# Patient Record
Sex: FEMALE | Race: WHITE | NOT HISPANIC OR LATINO | Employment: FULL TIME | URBAN - NONMETROPOLITAN AREA
[De-identification: names, ages, dates, MRNs, and addresses within clinical notes are randomized per-mention and may not be internally consistent; named-entity substitution may affect disease eponyms.]

---

## 2017-01-06 ENCOUNTER — AMBULATORY - GICH (OUTPATIENT)
Dept: FAMILY MEDICINE | Facility: OTHER | Age: 42
End: 2017-01-06

## 2017-01-06 DIAGNOSIS — Z30.42 ENCOUNTER FOR SURVEILLANCE OF INJECTABLE CONTRACEPTIVE: ICD-10-CM

## 2017-03-27 ENCOUNTER — AMBULATORY - GICH (OUTPATIENT)
Dept: FAMILY MEDICINE | Facility: OTHER | Age: 42
End: 2017-03-27

## 2017-04-21 ENCOUNTER — AMBULATORY - GICH (OUTPATIENT)
Dept: RADIOLOGY | Facility: OTHER | Age: 42
End: 2017-04-21

## 2017-04-21 DIAGNOSIS — R92.8 OTHER ABNORMAL AND INCONCLUSIVE FINDINGS ON DIAGNOSTIC IMAGING OF BREAST: ICD-10-CM

## 2017-05-05 ENCOUNTER — HOSPITAL ENCOUNTER (OUTPATIENT)
Dept: RADIOLOGY | Facility: OTHER | Age: 42
End: 2017-05-05
Attending: FAMILY MEDICINE

## 2017-05-05 ENCOUNTER — HISTORY (OUTPATIENT)
Dept: RADIOLOGY | Facility: OTHER | Age: 42
End: 2017-05-05

## 2017-05-05 DIAGNOSIS — R92.8 OTHER ABNORMAL AND INCONCLUSIVE FINDINGS ON DIAGNOSTIC IMAGING OF BREAST: ICD-10-CM

## 2017-06-16 ENCOUNTER — AMBULATORY - GICH (OUTPATIENT)
Dept: FAMILY MEDICINE | Facility: OTHER | Age: 42
End: 2017-06-16

## 2017-09-01 ENCOUNTER — AMBULATORY - GICH (OUTPATIENT)
Dept: FAMILY MEDICINE | Facility: OTHER | Age: 42
End: 2017-09-01

## 2017-10-17 ENCOUNTER — AMBULATORY - GICH (OUTPATIENT)
Dept: RADIOLOGY | Facility: OTHER | Age: 42
End: 2017-10-17

## 2017-10-17 DIAGNOSIS — R92.8 OTHER ABNORMAL AND INCONCLUSIVE FINDINGS ON DIAGNOSTIC IMAGING OF BREAST: ICD-10-CM

## 2017-11-20 ENCOUNTER — COMMUNICATION - GICH (OUTPATIENT)
Dept: FAMILY MEDICINE | Facility: OTHER | Age: 42
End: 2017-11-20

## 2017-11-20 ENCOUNTER — AMBULATORY - GICH (OUTPATIENT)
Dept: FAMILY MEDICINE | Facility: OTHER | Age: 42
End: 2017-11-20

## 2017-11-20 DIAGNOSIS — Z30.42 ENCOUNTER FOR SURVEILLANCE OF INJECTABLE CONTRACEPTIVE: ICD-10-CM

## 2017-12-28 NOTE — TELEPHONE ENCOUNTER
Patient Information     Patient Name MRN Sex Gala Wilder [id number] Female 1975      Telephone Encounter by Natasha Martinez at 2017  8:54 AM     Author:  Natasha Martinez Service:  (none) Author Type:  (none)     Filed:  2017  8:55 AM Encounter Date:  2017 Status:  Signed     :  Natasha Martinez            The order was placed in 2017, but her last physical was in 2016. Would you approve this shot for her today, then the injection nurse would let her know she should schedule a physical before she is due for the next one? She is on the schedule for the shot this afternoon.  Natasha Martinez [facility])  ..................2017   8:55 AM

## 2017-12-28 NOTE — TELEPHONE ENCOUNTER
Patient Information     Patient Name MRN Sex Gala Wilder [id number] Female 1975      Telephone Encounter by Clarissa Lanier RN at 2017  8:03 AM     Author:  Clarissa Lanier RN Service:  (none) Author Type:  NURS- Registered Nurse     Filed:  2017  8:06 AM Encounter Date:  2017 Status:  Signed     :  Clarissa Lanier RN (NURS- Registered Nurse)            Patient's order for Depo injection has  in October, PMD out of office till December. Schedulers to call and have pt seen for order renewal if another MD will not order for her . Last Pap was in  with OB GYN department and normal results. CLARISSA LANIER RN ....................  2017   8:04 AM

## 2017-12-30 NOTE — NURSING NOTE
Patient Information     Patient Name MRN Sex Gala Wilder [id number] Female 1975      Nursing Note by Clarissa Lanier RN at 2017  2:15 PM     Author:  Clarissa Lanier RN Service:  (none) Author Type:  NURS- Registered Nurse     Filed:  2017  2:14 PM Encounter Date:  2017 Status:  Signed     :  Clarissa Lanier RN (NURS- Registered Nurse)            Patient requests ongoing injections of Depo-Provera  Date of last DMPA:    Adverse reaction to last shot?  no  Heavy bleeding or more than 14 days bleeding since last shot?  no  Wants to continue contraception for another 3 months, knowing that fertility may not return for up to 18 months?  yes  Recent migraine headaches?  no  Breast problems since last shot?  no  Problems with excessive hair loss, acne or facial hair?  No     CLARISSA LANIER RN ....................  2017   2:08 PM

## 2017-12-30 NOTE — NURSING NOTE
Patient Information     Patient Name MRN Sex Gala Wilder [id number] Female 1975      Nursing Note by Jayde Santa RN at 2017  1:45 PM     Author:  Jayde Santa RN Service:  (none) Author Type:  NURS- Registered Nurse     Filed:  2017  2:03 PM Encounter Date:  2017 Status:  Signed     :  Jayde Santa RN (NURS- Registered Nurse)            Patient requests ongoing injections of Depo-Provera  Date of last DMPA:   Adverse reaction to last shot?  no  Heavy bleeding or more than 14 days bleeding since last shot?  no  Wants to continue contraception for another 3 months, knowing that fertility may not return for up to 18 months?  yes  Recent migraine headaches?  no  Breast problems since last shot?  no  Problems with excessive hair loss, acne or facial hair?  no    Jayde Santa RN ....................  2017   2:02 PM

## 2017-12-30 NOTE — NURSING NOTE
Patient Information     Patient Name MRN Sex Gala Wilder [id number] Female 1975      Nursing Note by Clarissa Lanier RN at 2017  2:15 PM     Author:  Clarissa Lanier RN Service:  (none) Author Type:  NURS- Registered Nurse     Filed:  2017  2:24 PM Encounter Date:  2017 Status:  Signed     :  Clarissa Lanier RN (NURS- Registered Nurse)            Patient requests ongoing injections of Depo-Provera  Date of last DMPA:    Adverse reaction to last shot?  no  Heavy bleeding or more than 14 days bleeding since last shot?  no  Wants to continue contraception for another 3 months, knowing that fertility may not return for up to 18 months?  yes  Recent migraine headaches?  no  Breast problems since last shot?  no  Problems with excessive hair loss, acne or facial hair?  No     CLARISSA LANIER RN ....................  2017   2:15 PM

## 2018-01-02 NOTE — NURSING NOTE
Patient Information     Patient Name MRN Sex Gala Wilder [id number] Female 1975      Nursing Note by Clarissa Lanier RN at 2017  2:30 PM     Author:  Clarissa Laneir RN Service:  (none) Author Type:  NURS- Registered Nurse     Filed:  2017  2:12 PM Encounter Date:  2017 Status:  Signed     :  Clarissa Lanier RN (NURS- Registered Nurse)            Patient requests ongoing injections of Depo-Provera  Date of last DMPA:    Adverse reaction to last shot?  no  Heavy bleeding or more than 14 days bleeding since last shot?  no  Wants to continue contraception for another 3 months, knowing that fertility may not return for up to 18 months?  yes  Recent migraine headaches?  no  Breast problems since last shot?  no  Problems with excessive hair loss, acne or facial hair?  No     CLARISSA LANIER RN ....................  2017   2:04 PM

## 2018-01-04 NOTE — NURSING NOTE
Patient Information     Patient Name MRN Sex Gala Wilder [id number] Female 1975      Nursing Note by Clarissa Lanier RN at 3/27/2017 11:15 AM     Author:  Clarissa Lanier RN Service:  (none) Author Type:  NURS- Registered Nurse     Filed:  3/27/2017 11:16 AM Encounter Date:  3/27/2017 Status:  Signed     :  Clarissa Lanier RN (NURS- Registered Nurse)            Patient requests ongoing injections of Depo-Provera  Date of last DMPA:    Adverse reaction to last shot?  no  Heavy bleeding or more than 14 days bleeding since last shot?  no  Wants to continue contraception for another 3 months, knowing that fertility may not return for up to 18 months?  yes  Recent migraine headaches?  no  Breast problems since last shot?  no  Problems with excessive hair loss, acne or facial hair?  No     CLARISSA LANIER RN ....................  3/27/2017   11:12 AM

## 2018-01-12 ENCOUNTER — HISTORY (OUTPATIENT)
Dept: RADIOLOGY | Facility: OTHER | Age: 43
End: 2018-01-12

## 2018-01-12 ENCOUNTER — HOSPITAL ENCOUNTER (OUTPATIENT)
Dept: RADIOLOGY | Facility: OTHER | Age: 43
End: 2018-01-12
Attending: FAMILY MEDICINE

## 2018-01-12 DIAGNOSIS — R92.8 OTHER ABNORMAL AND INCONCLUSIVE FINDINGS ON DIAGNOSTIC IMAGING OF BREAST: ICD-10-CM

## 2018-01-15 ENCOUNTER — HISTORY (OUTPATIENT)
Dept: OBGYN | Facility: OTHER | Age: 43
End: 2018-01-15

## 2018-01-15 ENCOUNTER — OFFICE VISIT - GICH (OUTPATIENT)
Dept: OBGYN | Facility: OTHER | Age: 43
End: 2018-01-15

## 2018-01-15 DIAGNOSIS — Z00.00 ENCOUNTER FOR GENERAL ADULT MEDICAL EXAMINATION WITHOUT ABNORMAL FINDINGS: ICD-10-CM

## 2018-01-15 DIAGNOSIS — F17.200 NICOTINE DEPENDENCE, UNCOMPLICATED: ICD-10-CM

## 2018-01-15 DIAGNOSIS — N87.0 MILD CERVICAL DYSPLASIA: ICD-10-CM

## 2018-01-15 DIAGNOSIS — Z80.3 FAMILY HISTORY OF MALIGNANT NEOPLASM OF BREAST: ICD-10-CM

## 2018-01-15 ASSESSMENT — PATIENT HEALTH QUESTIONNAIRE - PHQ9: SUM OF ALL RESPONSES TO PHQ QUESTIONS 1-9: 1

## 2018-01-18 PROBLEM — Z83.3 FAMILY HISTORY OF DIABETES MELLITUS: Status: ACTIVE | Noted: 2018-01-18

## 2018-01-18 PROBLEM — Z80.0 FAMILY HISTORY OF PANCREATIC CANCER: Status: ACTIVE | Noted: 2018-01-18

## 2018-01-18 RX ORDER — FLUTICASONE PROPIONATE 50 MCG
2 SPRAY, SUSPENSION (ML) NASAL DAILY
COMMUNITY
Start: 2016-02-19 | End: 2020-12-07

## 2018-01-18 RX ORDER — MEDROXYPROGESTERONE ACETATE 150 MG/ML
150 INJECTION, SUSPENSION INTRAMUSCULAR
COMMUNITY
Start: 2017-01-06 | End: 2020-12-07

## 2018-01-18 RX ORDER — BUPROPION HYDROCHLORIDE 150 MG/1
150 TABLET, EXTENDED RELEASE ORAL 2 TIMES DAILY
COMMUNITY
Start: 2016-02-19 | End: 2020-12-07

## 2018-01-27 VITALS — SYSTOLIC BLOOD PRESSURE: 124 MMHG | BODY MASS INDEX: 37.29 KG/M2 | DIASTOLIC BLOOD PRESSURE: 68 MMHG | WEIGHT: 240.4 LBS

## 2018-02-08 ENCOUNTER — DOCUMENTATION ONLY (OUTPATIENT)
Dept: FAMILY MEDICINE | Facility: OTHER | Age: 43
End: 2018-02-08

## 2018-02-09 VITALS
WEIGHT: 228 LBS | SYSTOLIC BLOOD PRESSURE: 156 MMHG | DIASTOLIC BLOOD PRESSURE: 100 MMHG | BODY MASS INDEX: 34.56 KG/M2 | HEIGHT: 68 IN | HEART RATE: 104 BPM

## 2018-02-11 ENCOUNTER — HEALTH MAINTENANCE LETTER (OUTPATIENT)
Age: 43
End: 2018-02-11

## 2018-02-11 ASSESSMENT — PATIENT HEALTH QUESTIONNAIRE - PHQ9: SUM OF ALL RESPONSES TO PHQ QUESTIONS 1-9: 1

## 2018-02-12 ENCOUNTER — ALLIED HEALTH/NURSE VISIT (OUTPATIENT)
Dept: FAMILY MEDICINE | Facility: OTHER | Age: 43
End: 2018-02-12
Attending: FAMILY MEDICINE
Payer: COMMERCIAL

## 2018-02-12 DIAGNOSIS — Z30.42 ENCOUNTER FOR SURVEILLANCE OF INJECTABLE CONTRACEPTIVE: Primary | ICD-10-CM

## 2018-02-12 PROCEDURE — 25000128 H RX IP 250 OP 636: Performed by: FAMILY MEDICINE

## 2018-02-12 PROCEDURE — 96372 THER/PROPH/DIAG INJ SC/IM: CPT | Performed by: FAMILY MEDICINE

## 2018-02-12 RX ORDER — MEDROXYPROGESTERONE ACETATE 150 MG/ML
150 INJECTION, SUSPENSION INTRAMUSCULAR
Status: DISCONTINUED | OUTPATIENT
Start: 2018-02-12 | End: 2018-10-08

## 2018-02-12 RX ADMIN — MEDROXYPROGESTERONE ACETATE 150 MG: 150 INJECTION, SUSPENSION INTRAMUSCULAR at 11:18

## 2018-02-12 NOTE — PROGRESS NOTES
Patient Information     Patient Name MRN Sex aGla Wilder [id number] Female 1975      Progress Notes by Kaity Cross at 2018  9:54 AM     Author:  Kaity Cross Service:  (none) Author Type:  (none)     Filed:  2018  9:54 AM Date of Service:  2018  9:54 AM Status:  Signed     :  Kaity Cross            Falls Risk Criteria:    Age 65 and older or under age 4        Sensory deficits    Poor vision    Use of ambulatory aides    Impaired judgment    Unable to walk independently    Meets High Risk criteria for falls:  no

## 2018-02-12 NOTE — MR AVS SNAPSHOT
"              After Visit Summary   2018    Gala Mendez    MRN: [id number]           Patient Information     Date Of Birth          1975        Visit Information        Provider Department      2018 11:15 AM  INJECTION NURSE [facility]        Today's Diagnoses     Encounter for surveillance of injectable contraceptive    -  1       Follow-ups after your visit        Who to contact     If you have questions or need follow up information about today's clinic visit or your schedule please contact [facility] directly at [phone].  Normal or non-critical lab and imaging results will be communicated to you by Boastifyhart, letter or phone within 4 business days after the clinic has received the results. If you do not hear from us within 7 days, please contact the clinic through [email or web address] or phone. If you have a critical or abnormal lab result, we will notify you by phone as soon as possible.  Submit refill requests through HourlyNerd or call your pharmacy and they will forward the refill request to us. Please allow 3 business days for your refill to be completed.          Additional Information About Your Visit        MyChart Information     HourlyNerd lets you send messages to your doctor, view your test results, renew your prescriptions, schedule appointments and more. To sign up, go to [email or web address] . Click on \"Log in\" on the left side of the screen, which will take you to the Welcome page. Then click on \"Sign up Now\" on the right side of the page.     You will be asked to enter the access code listed below, as well as some personal information. Please follow the directions to create your username and password.     Your access code is: [email or web address]  Expires: 2018 11:24 AM     Your access code will  in 90 days. If you need help or a new code, please call your [facility] or [phone].        Care EveryWhere ID     This is your Care " EveryWhere ID. This could be used by other organizations to access your [place] medical records  [id number]         Blood Pressure from Last 3 Encounters:   01/15/18 (!) 156/100   09/01/17 124/68   11/12/16 132/86    Weight from Last 3 Encounters:   01/15/18 228 lb (103.4 kg)   09/01/17 240 lb 6.4 oz (109 kg)   11/12/16 238 lb (108 kg)              Today, you had the following     No orders found for display       Primary Care Provider Office Phone # Fax #    Gaurav Dyer [phone] [address] [facility] [address]        Equal Access to Services     VIBHA GONZALEZ : Ish Ashraf, parag marsh, gasper kaalmadanny guerrero, layo liao . So [facility] [phone].    ATENCIÓN: Si habla español, tiene a serna disposición servicios gratuitos de asistencia lingüística. Llame al [phone].    We comply with applicable federal civil rights laws and [place] laws. We do not discriminate on the basis of race, color, national origin, age, disability, sex, sexual orientation, or gender identity.            Thank you!     Thank you for choosing [facility] AND [facility]  for your care. Our goal is always to provide you with excellent care. Hearing back from our patients is one way we can continue to improve our services. Please take a few minutes to complete the written survey that you may receive in the mail after your visit with us. Thank you!             Your Updated Medication List - Protect others around you: Learn how to safely use, store and throw away your medicines at www.disposemymeds.org.          This list is accurate as of 2/12/18 11:24 AM.  Always use your most recent med list.                   Brand Name Dispense Instructions for use Diagnosis    buPROPion 150 MG 12 hr tablet    WELLBUTRIN SR     Take 150 mg by mouth 2 times daily        fluticasone 50 MCG/ACT spray    FLONASE     Spray 2 sprays into both nostrils daily         medroxyPROGESTERone 150 MG/ML injection    DEPO-PROVERA     Inject 150 mg into the muscle

## 2018-02-12 NOTE — PROGRESS NOTES
Patientrequests ongoing injections of Depo-Provera  Date of last DMPA:    Adverse reaction to last shot?  no  Heavy bleeding or more than 14 days bleeding sincelast shot?  no  Wants to continue contraception for another 3 months, knowing that fertility may not return for up to 18 months?  yes  Recent migraine headaches?  no  Breast problems since last shot?  no  Problems with excessive hair loss, acne or facial hair?  No      Clarissa Lanier ....................  2/12/2018   11:15 AM

## 2018-02-12 NOTE — NURSING NOTE
Patient Information     Patient Name MRN Sex Gala Wilder [id number] Female 1975      Nursing Note by Naheed Flowers RN at 1/15/2018 10:00 AM     Author:  Naheed Flowers RN Service:  (none) Author Type:  NURS- Registered Nurse     Filed:  1/15/2018 10:24 AM Encounter Date:  1/15/2018 Status:  Signed     :  Naheed Flowers RN (NURS- Registered Nurse)            Patient is here for annual exam. Last pap smear collected was 2015 with normal results. Next due 2018. Patient would like this obtained today if possible. Also would like depo provera renewed for one year. Last shot given 2017. Next due 2018-2018.  Naheed Flowers RN............. 1/15/2018 9:45 AM

## 2018-02-13 NOTE — PROGRESS NOTES
Patient Information     Patient Name MRN Sex     Gala Mendez [id number] Female 1975      Progress Notes by Zoran Harrington MD at 1/15/2018 10:00 AM     Author:  Zoran Harrington MD Service:  (none) Author Type:  Physician     Filed:  1/15/2018 11:08 AM Encounter Date:  1/15/2018 Status:  Signed     :  Zoran Harrington MD (Physician)            ANNUAL EXAM ADULT FEMALE - GYN    Gala Mendez is a 42 y.o. female, G 2, P 2, here today for her annual gynecologic exam.    HPI:  Gala presents for an annual exam and also requests STD testing and she recently  form her .  She smokes approx. 1 pack od smokes a day but is considering giving them up.  Gala is employed full time at the [employer] and likes her job.  She underwent a mammogram last week.  She denies any pelvic pain, fevers, chills, nausea or vaginal discharge.     CURRENT MEDICATIONS:  Current Outpatient Prescriptions       Medication  Sig Dispense Refill     buPROPion (ZYBAN) 150 mg Sustained-Release tablet Take 1 tablet by mouth 2 times daily. 60 tablet 5     fluticasone (50 mcg per actuation) nasal solution (FLONASE) Inhale 2 Sprays into both nostrils once daily. PLEASE DISPENSE NASACORT OTC 32 g 11     medroxyPROGESTERone acetate, contraceptive, (DEPO-PROVERA) 150 mg/mL injection Inject 150 mg intramuscular every 3 months. 1 mL 0     Current Facility-Administered Medications         Medication  Dose Route Frequency Provider Last Rate     medroxyPROGESTERone acetate (contraceptive) 150 mg injection (DEPO-PROVERA)  150 mg Intra-Muscular q 12 weeks Gaurav Dyer MD       Medications have been reviewed by me and are current to the best of my knowledge and ability.      ALLERGIES:  Review of patient's allergies indicates no known allergies.     Past Medical History:     Diagnosis  Date     Abnormal pap 2008    with negative colposcopy      Hx of pregnancy     G2, P1-0-1-1,  x1, EAB x1         Past Surgical History:      Procedure  Laterality Date     COLPOSCOPY  2008    negative, abnormal pap       TONSILLECTOMY         SOCIAL HISTORY:  Social History     Social History        Marital status:       Spouse name: N/A     Number of children:  N/A     Years of education:  N/A     Occupational History      Not on file.     Social History Main Topics         Smoking status:  Current Every Day Smoker      Packs/day: 1.00      Years: 18.00      Types: Cigarettes      Smokeless tobacco:  Never Used      Alcohol use  0.6 oz/week     1 Standard drinks or equivalent per week      Drug use:  No      Sexual activity:  Yes      Partners: Male      Birth control/ protection: Injection      Other Topics  Concern     Not on file      Social History Narrative     Currently working on her dissertation for clinical psychology .      She works at a [employer].         and has a blended family of three boys,        p 8/28/2013.       Family History       Problem   Relation Age of Onset     Diabetes  Father      Diabetes  Other      family hx       Cancer-pancreatic  Other      family hx       Cancer-breast  Mother      Cancer-breast  Sister      Cancer-breast  Maternal Aunt        RISK FACTORS  Social History       Substance Use Topics         Smoking status:  Current Every Day Smoker      Packs/day: 1.00      Years: 18.00      Types: Cigarettes      Smokeless tobacco:  Never Used      Alcohol use  0.6 oz/week     1 Standard drinks or equivalent per week       Exercise Times/wk: 0  Seat Belt Use: 90%  Birth Control Method: Depo Provera  High Risk/Behavior: none    PREVENTIVE SERVICES  Preventive services were reviewed today, January 15, 2018.    LMP: Patient's last menstrual period was 11/13/2017 (approximate).  Menstrual Regularity: irregular  Flow: light    ROS  Review of Systems Negative.    PHYSICAL EXAM  /96 (Cuff Site: Right Arm, Position: Sitting, Cuff Size: Adult Regular)   "Pulse (!) 104  Ht 1.727 m (5' 8\")  Wt 103.4 kg (228 lb)  LMP 11/13/2017 (Approximate)  BMI 34.67 kg/m2  General Appearance:  Alert, appropriate appearance for age. No acute distress  HEENT Exam:  Grossly normal.  Neck / Thyroid Exam:  Supple, no masses, nodes or enlargement.  Chest/Respiratory Exam: Normal chest wall and respirations. Clear to auscultation.  Breast Exam: No dimpling, nipple retraction or discharge. No masses or nodes.  Cardiovascular Exam: Regular rate and rhythm. S1, S2, no murmur, click, gallop, or rubs.  Gastrointestinal Exam: Soft, non-tender, no masses or organomegaly.  Pelvic Exam Female: Vulva and vagina appear normal. Bimanual exam reveals normal uterus and adnexa. Clinical staff offered to be present for exam: Deep.   Lymphatic Exam: Non-palpable nodes in neck, clavicular, axillary, or inguinal regions.  Musculoskeletal Exam: Back is straight and non-tender, full ROM of upper and lower extremities.  Skin: no rash or abnormalities  Neurologic Exam: Normal gait and speech, no tremor.  Psychiatric Exam: Alert and oriented, appropriate affect.  Clinical staff offered to be present for exam: yes, initials of staff present: MAXIME  PHQ Depression Screening 1/15/2018   Date of PHQ exam (doc flow) 1/15/2018   1. Lack of interest/pleasure 0 - Not at all   2. Feeling down/depressed 0 - Not at all   PHQ-2 TOTAL SCORE 0   3. Trouble sleeping 0 - Not at all   4. Decreased energy 1 - Several days   5. Appetite change 0 - Not at all   6. Feelings of failure 0 - Not at all   7. Trouble concentrating 0 - Not at all   8. Activity level 0 - Not at all   9. Hurting yourself 0 - Not at all   PHQ-9 TOTAL SCORE 1   PHQ-9 Severity Level none   Functional Impairment not applicable   Some recent data might be hidden        ASSESSMENT/PLAN  1. Annual exam with normal findings  2. GC/chlamydia testing per patients request.  3. Tobacco use, counseling for 5 minutes.  4. Elevated blood pressure    BP Readings from Last " 1 Encounters:01/15/18 : 154/96  Ms. Mendez's blood pressure is out of the normal range for adults. Per JNC-8 guidelines normal adult blood pressure is < 120/80, pre-hypertensive is between 120/80 and 139/89, and hypertension is 140/90 or greater. Risks of hypertension were discussed. Patient's strategy will be weight loss and to recheck blood pressure in 1 months

## 2018-02-13 NOTE — NURSING NOTE
Patient Information     Patient Name MRN Gala Crowell [id number] Female 1975      Nursing Note by Naheed Flowers RN at 1/15/2018 10:00 AM     Author:  Naheed Flowers RN Service:  (none) Author Type:  NURS- Registered Nurse     Filed:  1/15/2018 10:25 AM Encounter Date:  1/15/2018 Status:  Signed     :  Naheed Flowers RN (NURS- Registered Nurse)            Patient's blood pressure rechecked. Was elevated again - 156/100. Patient offered same day appointment with primary physician. Patient declined siting personal stress as possible aggravator. Patient will make appointment with PCP at a later date to have blood pressure rechecked.  Naheed Flowers RN............. 1/15/2018 10:25 AM

## 2018-05-04 ENCOUNTER — ALLIED HEALTH/NURSE VISIT (OUTPATIENT)
Dept: FAMILY MEDICINE | Facility: OTHER | Age: 43
End: 2018-05-04
Attending: FAMILY MEDICINE
Payer: COMMERCIAL

## 2018-05-04 DIAGNOSIS — Z30.42 ENCOUNTER FOR SURVEILLANCE OF INJECTABLE CONTRACEPTIVE: Primary | ICD-10-CM

## 2018-05-04 PROCEDURE — 25000128 H RX IP 250 OP 636: Performed by: FAMILY MEDICINE

## 2018-05-04 PROCEDURE — 96372 THER/PROPH/DIAG INJ SC/IM: CPT

## 2018-05-04 RX ADMIN — MEDROXYPROGESTERONE ACETATE 150 MG: 150 INJECTION, SUSPENSION INTRAMUSCULAR at 13:05

## 2018-05-04 NOTE — PROGRESS NOTES
Patientrequests ongoing injections of Depo-Provera  Date of last DMPA:    Adverse reaction to last shot?  no  Heavy bleeding or more than 14 days bleeding sincelast shot?  no  Wants to continue contraception for another 3 months, knowing that fertility may not return for up to 18 months?  yes  Recent migraine headaches?  no  Breast problems since last shot?  no  Problems with excessive hair loss, acne or facial hair?  No     Clarissa Lanier ....................  5/4/2018   1:04 PM

## 2018-05-04 NOTE — MR AVS SNAPSHOT
"              After Visit Summary   5/4/2018    Gala Mendez    MRN: [id number]           Patient Information     Date Of Birth          1975        Visit Information        Provider Department      5/4/2018 1:15 PM  INJECTION NURSE [facility]        Today's Diagnoses     Encounter for surveillance of injectable contraceptive    -  1       Follow-ups after your visit        Your next 10 appointments already scheduled     May 04, 2018  1:15 PM CDT   Nurse Only with  INJECTION NURSE   [facility] ([facility])    [address]  [place] [address] [phone]              Who to contact     If you have questions or need follow up information about today's clinic visit or your schedule please contact [facility] directly at [phone].  Normal or non-critical lab and imaging results will be communicated to you by Eve Biomedicalhart, letter or phone within 4 business days after the clinic has received the results. If you do not hear from us within 7 days, please contact the clinic through [employer]icalhart or phone. If you have a critical or abnormal lab result, we will notify you by phone as soon as possible.  Submit refill requests through [email or web address] or call your pharmacy and they will forward the refill request to us. Please allow 3 business days for your refill to be completed.          Additional Information About Your Visit        Eve Biomedicalhart Information     [email or web address] lets you send messages to your doctor, view your test results, renew your prescriptions, schedule appointments and more. To sign up, go to [email or web address] . Click on \"Log in\" on the left side of the screen, which will take you to the Welcome page. Then click on \"Sign up Now\" on the right side of the page.     You will be asked to enter the access code listed below, as well as some personal information. Please follow the directions to create your username " and password.     Your access code is: [email or web address]  Expires: 2018 12:24 PM     Your access code will  in 90 days. If you need help or a new code, please call your [place] clinic or [phone].        Care EveryWhere ID     This is your Care EveryWhere ID. This could be used by other organizations to access your [place] medical records  [id number]         Blood Pressure from Last 3 Encounters:   01/15/18 (!) 156/100   17 124/68   16 132/86    Weight from Last 3 Encounters:   01/15/18 228 lb (103.4 kg)   17 240 lb 6.4 oz (109 kg)   16 238 lb (108 kg)              Today, you had the following     No orders found for display       Primary Care Provider Office Phone # Fax #    Gaurav Dyer [phone] [address] [facility] [address]        Equal Access to Services     [facility]: Hadii aad ku hadasho Soomaali, waaxda luqadaha, qaybta kaalmada adeegyada, waxay idiin hayaan nela liao . So [facility] [phone].    ATENCIÓN: Si habla español, tiene a serna disposición servicios gratuitos de asistencia lingüística. Llame al [phone].    We comply with applicable federal civil rights laws and [place] laws. We do not discriminate on the basis of race, color, national origin, age, disability, sex, sexual orientation, or gender identity.            Thank you!     Thank you for choosing [facility] AND [facility]  for your care. Our goal is always to provide you with excellent care. Hearing back from our patients is one way we can continue to improve our services. Please take a few minutes to complete the written survey that you may receive in the mail after your visit with us. Thank you!             Your Updated Medication List - Protect others around you: Learn how to safely use, store and throw away your medicines at www.disposemymeds.org.          This list is accurate as of 18  1:10 PM.  Always use your most recent med  list.                   Brand Name Dispense Instructions for use Diagnosis    buPROPion 150 MG 12 hr tablet    WELLBUTRIN SR     Take 150 mg by mouth 2 times daily        fluticasone 50 MCG/ACT spray    FLONASE     Spray 2 sprays into both nostrils daily        medroxyPROGESTERone 150 MG/ML injection    DEPO-PROVERA     Inject 150 mg into the muscle

## 2018-07-23 ENCOUNTER — ALLIED HEALTH/NURSE VISIT (OUTPATIENT)
Dept: FAMILY MEDICINE | Facility: OTHER | Age: 43
End: 2018-07-23
Attending: FAMILY MEDICINE
Payer: COMMERCIAL

## 2018-07-23 DIAGNOSIS — Z30.42 ENCOUNTER FOR DEPO-PROVERA CONTRACEPTION: Primary | ICD-10-CM

## 2018-07-23 PROCEDURE — 96372 THER/PROPH/DIAG INJ SC/IM: CPT

## 2018-07-23 PROCEDURE — 25000128 H RX IP 250 OP 636: Performed by: FAMILY MEDICINE

## 2018-07-23 RX ADMIN — MEDROXYPROGESTERONE ACETATE 150 MG: 150 INJECTION, SUSPENSION INTRAMUSCULAR at 08:40

## 2018-07-23 NOTE — PROGRESS NOTES
Patient Information     Patient Name  Gala Mendez MRN  [id number] Sex  Female   1975      Letter by Dm Guevara MD at      Author:  Dm Guevara MD Service:  (none) Author Type:  (none)    Filed:   Date of Service:   Status:  (Other)       [facility]  [address]  [place] [address]  [phone]         Gala Mendez   [address]  [facility] [address]      January 15, 2018  Date of Breast Imagin2018 10:23 AM    Dear Ms. Mendez:    We are pleased to inform you that the result of your recent breast imaging examination is normal/benign (not cancer).    A report of your results was sent to your health care provider(s).    Your images will become part of your medical file here at [facility] and will be available for your continuing care. You are responsible for informing any new health care provider or breast imaging facility of the date and location of this examination.    Although mammography is the most accurate method for early detection, not all cancers are found through mammography. If you notice any new changes in your breast(s) please inform your health care provider without delay.    Thank you for choosing [facility] to participate in your healthcare needs.         [facility] Recommendations for Early Breast Cancer Detection   in Women without Symptoms  When to start having mammograms to screen for breast cancer, and how often to have them, is a personal decision. It should be based on your preferences, your values and your risk for developing breast cancer. [facility] recommends that you and your health care provider together determine when mammograms are right for you.    [facility] recommends the following guidelines for women who have an average risk for breast cancer, based on American Cancer Society guidelines:    Age 40 to 44: Mammograms are  optional.     Age 45 to 54: Have a mammogram every year.           Age 55 and older: Have a mammogram every year, or transition to having one every 2 years. Continue to have mammograms as long as your health is good.    If you have a higher than average risk for breast cancer, your health care provider may recommend a different schedule.

## 2018-07-23 NOTE — PROGRESS NOTES
Patient Information     Patient Name  Gala Mendez MRN  [id number] Sex  Female   1975      Letter by Kaity Cross at      Author:  Kaity Cross Service:  (none) Author Type:  (none)    Filed:   Date of Service:   Status:  (Other)       [facility]  [address]  [place] [address]  [phone]                 Gala Mendez  [address] [place] [address]              2017    Dear Ms. Mendez:  It is time to schedule your follow up breast imaging.  Please call [phone] to schedule an appointment for this test if you have not already done so.    Thank you for choosing [facility] to participate in your healthcare needs.     Sincerely,    Mammography    [facility]                             [facility] Recommendations for Early Breast Cancer Detection   in Women without Symptoms  Ages 40-49: encouraged to have a screening mammogram every year or talk with your health care provider  Ages 50-74: should have a screening mammogram every year  Ages 75 and older: talk with your health care provider about how often to schedule a mammogram

## 2018-07-23 NOTE — MR AVS SNAPSHOT
"              After Visit Summary   2018    Gala Mendez    MRN: [id number]           Patient Information     Date Of Birth          1975        Visit Information        Provider Department      2018 8:45 AM  INJECTION NURSE [facility]        Today's Diagnoses     Encounter for Depo-Provera contraception    -  1       Follow-ups after your visit        Who to contact     If you have questions or need follow up information about today's clinic visit or your schedule please contact [facility] directly at [phone].  Normal or non-critical lab and imaging results will be communicated to you by RCT Logichart, letter or phone within 4 business days after the clinic has received the results. If you do not hear from us within 7 days, please contact the clinic through Done In :60 Seconds or phone. If you have a critical or abnormal lab result, we will notify you by phone as soon as possible.  Submit refill requests through Done In :60 Seconds or call your pharmacy and they will forward the refill request to us. Please allow 3 business days for your refill to be completed.          Additional Information About Your Visit        MyChart Information     Done In :60 Seconds lets you send messages to your doctor, view your test results, renew your prescriptions, schedule appointments and more. To sign up, go to [email or web address] :60 Seconds . Click on \"Log in\" on the left side of the screen, which will take you to the Welcome page. Then click on \"Sign up Now\" on the right side of the page.     You will be asked to enter the access code listed below, as well as some personal information. Please follow the directions to create your username and password.     Your access code is: [email or web address]  Expires: 10/21/2018  8:36 AM     Your access code will  in 90 days. If you need help or a new code, please call your [facility] or [phone].        Care EveryWhere ID     This is your Care EveryWhere ID. " This could be used by other organizations to access your [facility] medical records  [id number]         Blood Pressure from Last 3 Encounters:   01/15/18 (!) 156/100   09/01/17 124/68   11/12/16 132/86    Weight from Last 3 Encounters:   01/15/18 228 lb (103.4 kg)   09/01/17 240 lb 6.4 oz (109 kg)   11/12/16 238 lb (108 kg)              Today, you had the following     No orders found for display       Primary Care Provider Office Phone # Fax #    Gaurav Dyer [phone] [address] [facility] [address]        Equal Access to Services     VIBHA [facility] CenterKANIKA : Hadii harry Ashraf, parag marsh, gasper pinomadanny guerrero, layo perez. So [facility] [phone].    ATENCIÓN: Si habla español, tiene a serna disposición servicios gratuitos de asistencia lingüística. Llame al [phone].    We comply with applicable federal civil rights laws and [place] laws. We do not discriminate on the basis of race, color, national origin, age, disability, sex, sexual orientation, or gender identity.            Thank you!     Thank you for choosing [facility]  for your care. Our goal is always to provide you with excellent care. Hearing back from our patients is one way we can continue to improve our services. Please take a few minutes to complete the written survey that you may receive in the mail after your visit with us. Thank you!             Your Updated Medication List - Protect others around you: Learn how to safely use, store and throw away your medicines at www.disposemymeds.org.          This list is accurate as of 7/23/18  8:47 AM.  Always use your most recent med list.                   Brand Name Dispense Instructions for use Diagnosis    buPROPion 150 MG 12 hr tablet    WELLBUTRIN SR     Take 150 mg by mouth 2 times daily        fluticasone 50 MCG/ACT spray    FLONASE     Spray 2 sprays into both nostrils daily         medroxyPROGESTERone 150 MG/ML injection    DEPO-PROVERA     Inject 150 mg into the muscle

## 2018-07-23 NOTE — PROGRESS NOTES
Patientrequests ongoing injections of Depo-Provera  Date of last DMPA:    Adverse reaction to last shot?  no  Heavy bleeding or more than 14 days bleeding sincelast shot?  no  Wants to continue contraception for another 3 months, knowing that fertility may not return for up to 18 months?  yes  Recent migraine headaches?  no  Breast problems since last shot?  no  Problems with excessive hair loss, acne or facial hair?  No     Clarissa Lanier ....................  7/23/2018   8:39 AM

## 2018-07-24 NOTE — PROGRESS NOTES
Patient Information     Patient Name  Gala Mendez MRN  [id number] Sex  Female   1975      Letter by Jayde Haskins MD at      Author:  Jayde Haskins MD Service:  (none) Author Type:  (none)    Filed:   Date of Service:   Status:  (Other)       [facility]  [address]  [place] [address]  [phone]         Gala Mendez   [address]  [facility] [address]      May 9, 2017  Date of Breast Imagin2017  8:44 AM    Dear Ms. Mendez:    Your recent breast imaging examination showed an area that we believe is probably benign (probably not cancer). However, in 6 month(s), you should have a follow-up breast imaging study to confirm that this area has not changed. Please call [phone] to schedule an appointment for these tests if you have not already done so.    A report of your results was sent to your health care provider(s).    Your images will become part of your medical file here at [facility] and will be available for your continuing care. You are responsible for informing any new health care provider or breast imaging facility of the date and location of this examination.    Although mammography is the most accurate method for early detection, not all cancers are found through mammography. If you notice any new changes in your breast(s) please inform your health care provider without delay.    Thank you for choosing [facility] to participate in your healthcare needs.         [facility] Recommendations for Early Breast Cancer Detection   in Women without Symptoms  When to start having mammograms to screen for breast cancer, and how often to have them, is a personal decision. It should be based on your preferences, your values and your risk for developing breast cancer. [facility] recommends that you and your health care provider together determine when mammograms are right for you.    [facility] recommends the following guidelines for women who have an average risk for breast cancer, based on American Cancer Society guidelines:    Age 40 to 44: Mammograms are optional.     Age 45 to 54: Have a mammogram every year.           Age 55 and older: Have a mammogram every year, or transition to having one every 2 years. Continue to have mammograms as long as your health is good.    If you have a higher than average risk for breast cancer, your health care provider may recommend a different schedule.

## 2018-07-24 NOTE — PROGRESS NOTES
Patient Information     Patient Name  Gala Mendez MRN  [id number] Sex  Female   1975      Letter by Zoran Harrington MD at      Author:  Zoran Harrington MD Service:  (none) Author Type:  (none)    Filed:   Encounter Date:  1/15/2018 Status:  (Other)           Gala Mendez   [address] [place] [address]          2018    Dear Ms. Mendez:    Following are the tests completed during your last clinic visit.  The results of these tests are normal and require no further attention.    Pap smear    If you have any further questions or problems contact my office at  [phone]    Thank you,    Dr Zoran Harrington MD, NEEMA TURCIOS RN  Registered Nurse for [facility] OB/Gyn providers

## 2018-10-08 ENCOUNTER — ALLIED HEALTH/NURSE VISIT (OUTPATIENT)
Dept: FAMILY MEDICINE | Facility: OTHER | Age: 43
End: 2018-10-08
Attending: FAMILY MEDICINE
Payer: COMMERCIAL

## 2018-10-08 DIAGNOSIS — Z30.42 ENCOUNTER FOR SURVEILLANCE OF INJECTABLE CONTRACEPTIVE: ICD-10-CM

## 2018-10-08 DIAGNOSIS — Z30.42 ENCOUNTER FOR DEPO-PROVERA CONTRACEPTION: Primary | ICD-10-CM

## 2018-10-08 PROCEDURE — 96372 THER/PROPH/DIAG INJ SC/IM: CPT

## 2018-10-08 PROCEDURE — 25000128 H RX IP 250 OP 636: Performed by: FAMILY MEDICINE

## 2018-10-08 RX ORDER — MEDROXYPROGESTERONE ACETATE 150 MG/ML
150 INJECTION, SUSPENSION INTRAMUSCULAR CONTINUOUS PRN
Status: COMPLETED | OUTPATIENT
Start: 2018-10-08 | End: 2018-12-28

## 2018-10-08 RX ADMIN — MEDROXYPROGESTERONE ACETATE 150 MG: 150 INJECTION, SUSPENSION INTRAMUSCULAR at 13:44

## 2018-10-08 NOTE — MR AVS SNAPSHOT
"              After Visit Summary   10/8/2018    Gala Mendez    MRN: [id number]           Patient Information     Date Of Birth          1975        Visit Information        Provider Department      10/8/2018 1:45 PM  INJECTION NURSE [facility]        Today's Diagnoses     Encounter for Depo-Provera contraception    -  1    Encounter for surveillance of injectable contraceptive           Follow-ups after your visit        Who to contact     If you have questions or need follow up information about today's clinic visit or your schedule please contact [facility] directly at [phone].  Normal or non-critical lab and imaging results will be communicated to you by Vidatronichart, letter or phone within 4 business days after the clinic has received the results. If you do not hear from us within 7 days, please contact the clinic through Kinoost or phone. If you have a critical or abnormal lab result, we will notify you by phone as soon as possible.  Submit refill requests through ClearCare or call your pharmacy and they will forward the refill request to us. Please allow 3 business days for your refill to be completed.          Additional Information About Your Visit        MyChart Information     ClearCare lets you send messages to your doctor, view your test results, renew your prescriptions, schedule appointments and more. To sign up, go to [email or web address] . Click on \"Log in\" on the left side of the screen, which will take you to the Welcome page. Then click on \"Sign up Now\" on the right side of the page.     You will be asked to enter the access code listed below, as well as some personal information. Please follow the directions to create your username and password.     Your access code is: [email or web address]  Expires: 2019  1:38 PM     Your access code will  in 90 days. If you need help or a new code, please call your [place] clinic or [phone].      "   Care EveryWhere ID     This is your Care EveryWhere ID. This could be used by other organizations to access your [facility] medical records  [id number]         Blood Pressure from Last 3 Encounters:   01/15/18 (!) 156/100   09/01/17 124/68   11/12/16 132/86    Weight from Last 3 Encounters:   01/15/18 228 lb (103.4 kg)   09/01/17 240 lb 6.4 oz (109 kg)   11/12/16 238 lb (108 kg)              Today, you had the following     No orders found for display       Primary Care Provider Office Phone # Fax #    Gaurav Dyer [phone] [address]  [facility] [address]        Equal Access to Services     PHIL GONZALEZ : Hadii harry umanzoro Andriy, waaxda luqadaha, qaybta kaalmada adeegyada, layo liao . So [facility] [phone].    ATENCIÓN: Si habla español, tiene a serna disposición servicios gratuitos de asistencia lingüística. LlPaulding County Hospital [phone].    We comply with applicable federal civil rights laws and [place] laws. We do not discriminate on the basis of race, color, national origin, age, disability, sex, sexual orientation, or gender identity.            Thank you!     Thank you for choosing [facility]  for your care. Our goal is always to provide you with excellent care. Hearing back from our patients is one way we can continue to improve our services. Please take a few minutes to complete the written survey that you may receive in the mail after your visit with us. Thank you!             Your Updated Medication List - Protect others around you: Learn how to safely use, store and throw away your medicines at www.disposemymeds.org.          This list is accurate as of 10/8/18  1:51 PM.  Always use your most recent med list.                   Brand Name Dispense Instructions for use Diagnosis    buPROPion 150 MG 12 hr tablet    WELLBUTRIN SR     Take 150 mg by mouth 2 times daily        fluticasone 50 MCG/ACT spray    FLONASE      Spray 2 sprays into both nostrils daily        medroxyPROGESTERone 150 MG/ML injection    DEPO-PROVERA     Inject 150 mg into the muscle

## 2018-10-08 NOTE — PROGRESS NOTES
Patientrequests ongoing injections of Depo-Provera  Date of last DMPA:    Adverse reaction to last shot?  no  Heavy bleeding or more than 14 days bleeding sincelast shot?  no  Wants to continue contraception for another 3 months, knowing that fertility may not return for up to 18 months?  yes  Recent migraine headaches?  no  Breast problems since last shot?  no  Problems with excessive hair loss, acne or facial hair?  No     Clarissa Lanier ....................  10/8/2018   1:42 PM

## 2018-12-28 ENCOUNTER — ALLIED HEALTH/NURSE VISIT (OUTPATIENT)
Dept: FAMILY MEDICINE | Facility: OTHER | Age: 43
End: 2018-12-28
Attending: FAMILY MEDICINE
Payer: COMMERCIAL

## 2018-12-28 DIAGNOSIS — Z30.42 SURVEILLANCE FOR DEPO-PROVERA CONTRACEPTION: Primary | ICD-10-CM

## 2018-12-28 PROCEDURE — 25000128 H RX IP 250 OP 636: Performed by: FAMILY MEDICINE

## 2018-12-28 PROCEDURE — 96372 THER/PROPH/DIAG INJ SC/IM: CPT

## 2018-12-28 RX ADMIN — MEDROXYPROGESTERONE ACETATE 150 MG: 150 INJECTION, SUSPENSION INTRAMUSCULAR at 11:05

## 2018-12-28 NOTE — NURSING NOTE
Patientrequests ongoing injections of Depo-Provera  Date of last DMPA:    Adverse reaction to last shot?  No  Heavy bleeding or more than 14 days bleeding sincelast shot?  No  Wants to continue contraception for another 3 months, knowing that fertility may not return for up to 18 months?  Yes  Recent migraine headaches?  No  Breast problems since last shot?  No  Problems with excessive hair loss, acne or facial hair?  No    Leeann Hennessy ....................  12/28/2018   11:05 AM

## 2019-03-18 ENCOUNTER — ALLIED HEALTH/NURSE VISIT (OUTPATIENT)
Dept: FAMILY MEDICINE | Facility: OTHER | Age: 44
End: 2019-03-18
Attending: FAMILY MEDICINE
Payer: COMMERCIAL

## 2019-03-18 ENCOUNTER — HOSPITAL ENCOUNTER (OUTPATIENT)
Dept: MAMMOGRAPHY | Facility: OTHER | Age: 44
Discharge: HOME | End: 2019-03-18
Attending: FAMILY MEDICINE | Admitting: FAMILY MEDICINE
Payer: COMMERCIAL

## 2019-03-18 DIAGNOSIS — Z12.39 SCREENING BREAST EXAMINATION: ICD-10-CM

## 2019-03-18 DIAGNOSIS — Z30.42 ENCOUNTER FOR DEPO-PROVERA CONTRACEPTION: Primary | ICD-10-CM

## 2019-03-18 PROCEDURE — 96372 THER/PROPH/DIAG INJ SC/IM: CPT

## 2019-03-18 PROCEDURE — 77067 SCR MAMMO BI INCL CAD: CPT

## 2019-03-18 PROCEDURE — 25000128 H RX IP 250 OP 636: Performed by: FAMILY MEDICINE

## 2019-03-18 RX ORDER — MEDROXYPROGESTERONE ACETATE 150 MG/ML
150 INJECTION, SUSPENSION INTRAMUSCULAR CONTINUOUS PRN
Status: COMPLETED | OUTPATIENT
Start: 2019-03-18 | End: 2019-03-18

## 2019-03-18 RX ADMIN — MEDROXYPROGESTERONE ACETATE 150 MG: 150 INJECTION, SUSPENSION INTRAMUSCULAR at 10:13

## 2019-06-03 ENCOUNTER — OFFICE VISIT (OUTPATIENT)
Dept: OBGYN | Facility: OTHER | Age: 44
End: 2019-06-03
Attending: OBSTETRICS & GYNECOLOGY
Payer: COMMERCIAL

## 2019-06-03 VITALS
WEIGHT: 224 LBS | DIASTOLIC BLOOD PRESSURE: 80 MMHG | HEIGHT: 68 IN | SYSTOLIC BLOOD PRESSURE: 124 MMHG | BODY MASS INDEX: 33.95 KG/M2 | HEART RATE: 60 BPM

## 2019-06-03 DIAGNOSIS — Z12.4 PAP SMEAR FOR CERVICAL CANCER SCREENING: Primary | ICD-10-CM

## 2019-06-03 DIAGNOSIS — Z30.42 ENCOUNTER FOR SURVEILLANCE OF INJECTABLE CONTRACEPTIVE: ICD-10-CM

## 2019-06-03 PROCEDURE — 96372 THER/PROPH/DIAG INJ SC/IM: CPT

## 2019-06-03 PROCEDURE — G0123 SCREEN CERV/VAG THIN LAYER: HCPCS | Performed by: OBSTETRICS & GYNECOLOGY

## 2019-06-03 PROCEDURE — 99213 OFFICE O/P EST LOW 20 MIN: CPT | Performed by: OBSTETRICS & GYNECOLOGY

## 2019-06-03 PROCEDURE — 87624 HPV HI-RISK TYP POOLED RSLT: CPT | Mod: ZL | Performed by: OBSTETRICS & GYNECOLOGY

## 2019-06-03 PROCEDURE — 88142 CYTOPATH C/V THIN LAYER: CPT | Performed by: OBSTETRICS & GYNECOLOGY

## 2019-06-03 PROCEDURE — 25000128 H RX IP 250 OP 636: Performed by: OBSTETRICS & GYNECOLOGY

## 2019-06-03 RX ORDER — MEDROXYPROGESTERONE ACETATE 150 MG/ML
150 INJECTION, SUSPENSION INTRAMUSCULAR ONCE
Status: COMPLETED | OUTPATIENT
Start: 2019-06-03 | End: 2019-06-03

## 2019-06-03 RX ADMIN — MEDROXYPROGESTERONE ACETATE 150 MG: 150 INJECTION, SUSPENSION INTRAMUSCULAR at 10:40

## 2019-06-03 ASSESSMENT — PAIN SCALES - GENERAL: PAINLEVEL: NO PAIN (0)

## 2019-06-03 ASSESSMENT — MIFFLIN-ST. JEOR: SCORE: 1724.56

## 2019-06-03 NOTE — NURSING NOTE
Chief Complaint   Patient presents with     Gyn Exam     Pap smear        Medication Reconciliation: completed   Clarissa Tai LPN  6/3/2019 9:51 AM

## 2019-06-04 NOTE — PROGRESS NOTES
SUBJECTIVE:   Gala Mendez is a 43 year old female who presents for annual physical, pap and pelvic. She is amenorrheic for over a decade, she has been on Depo for more than a decade. She reports no side effects of this medication. She is interested in continuing depo and is due today for her next injection. She reports light spotting about once a year.   No signs or symptoms of menopause. She is unsure when her mother went through menopause, as she had a hysterectomy in her early 40s.    History of LSIL with high risk HPV and negative colposcopy in . ASCUS in . Most recent pap smears in 2018 and 2015 have been negative.   Denies bleeding, spotting, or abnormal vaginal discharge. Denies bowel or bladder issues.   Smoking cigarettes 1/2 pack/day. Not interested in quitting at this time.     Patient Active Problem List   Diagnosis     Allergic rhinitis     Backache     Cervical high risk human papillomavirus (HPV) DNA test positive     Cervical dysplasia, mild     Encounter for surveillance of injectable contraceptive     Family history of diabetes mellitus     Family history of pancreatic cancer     FHx: breast cancer     Papanicolaou smear of cervix with atypical squamous cells of undetermined significance (ASC-US)     Tobacco abuse     Past Medical History:   Diagnosis Date     Personal history of other medical treatment (CODE)     G2, P1-0-1-1,  x1, EAB x1     Personal history of other medical treatment (CODE)     2008,with negative colposcopy     Past Surgical History:   Procedure Laterality Date     OTHER SURGICAL HISTORY      ,[id number],COLPOSCOPY,negative, abnormal pap     TONSILLECTOMY      No Comments Provided     Current Outpatient Medications   Medication Sig Dispense Refill     buPROPion (WELLBUTRIN SR) 150 MG 12 hr tablet Take 150 mg by mouth 2 times daily       fluticasone (FLONASE) 50 MCG/ACT spray Spray 2 sprays into both nostrils daily       medroxyPROGESTERone  (DEPO-PROVERA) 150 MG/ML injection Inject 150 mg into the muscle       No Known Allergies  Social History     Socioeconomic History     Marital status: Single     Spouse name: None     Number of children: None     Years of education: None     Highest education level: None   Occupational History     None   Social Needs     Financial resource strain: None     Food insecurity:     Worry: None     Inability: None     Transportation needs:     Medical: None     Non-medical: None   Tobacco Use     Smoking status: Current Every Day Smoker     Packs/day: 0.75     Years: 18.00     Pack years: 13.50     Types: Cigarettes     Smokeless tobacco: Never Used   Substance and Sexual Activity     Alcohol use: Yes     Alcohol/week: 0.6 oz     Drug use: Never     Sexual activity: Yes     Partners: Male     Birth control/protection: Injection   Lifestyle     Physical activity:     Days per week: None     Minutes per session: None     Stress: None   Relationships     Social connections:     Talks on phone: None     Gets together: None     Attends Druze service: None     Active member of club or organization: None     Attends meetings of clubs or organizations: None     Relationship status: None     Intimate partner violence:     Fear of current or ex partner: None     Emotionally abused: None     Physically abused: None     Forced sexual activity: None   Other Topics Concern     None   Social History Narrative    Currently working on her dissertation for clinical psychology .      She works at a [employer].         and has a blended family of three boys,    p 8/28/2013.     Family History   Problem Relation Age of Onset     Diabetes Father         Diabetes     Diabetes Other         Diabetes,family hx     Pancreatic Cancer Other         Cancer-pancreatic,family hx     Breast Cancer Mother         Cancer-breast     Breast Cancer Sister         Cancer-breast     Breast Cancer Maternal Aunt         Cancer-breast  "      HEALTH MAINTENANCE:  Health Maintenance   Topic Date Due     PREVENTIVE CARE VISIT  1975     HIV SCREENING  08/10/1991     INFLUENZA VACCINE (Season Ended) 09/01/2019     PAP  01/15/2021     DTAP/TDAP/TD IMMUNIZATION (7 - Td) 10/24/2024     ZOSTER IMMUNIZATION (1 of 2) 08/10/2026     PHQ-2  Completed     IPV IMMUNIZATION  Completed     MENINGITIS IMMUNIZATION  Aged Out       REVIEW OF OUTSIDE RECORDS: YES - Date: 6/3/2019    OBJECTIVE:  /80 (BP Location: Right arm, Patient Position: Sitting, Cuff Size: Adult Large)   Pulse 60   Ht 1.727 m (5' 8\")   Wt 101.6 kg (224 lb)   Breastfeeding? No   BMI 34.06 kg/m    EXAM:   (female):  Vulva: No external lesions, normal hair distribution  Vagina: Moist, pink, no abnormal discharge, well rugated, no lesions  Cervix: Pap smear is taken, parous, smooth, pink, no visible lesions  Uterus: Normal size, anteverted, non-tender, mobile  Ovaries: No mass, non-tender, mobile     ASSESSMENT/PLAN  (Z12.4) Pap smear for cervical cancer screening  (primary encounter diagnosis)  Comment: pending  Plan: HPV High Risk Types DNA Cervical, Pap Screen         Thin Prep with HPV - recommended age 30 - 65         years (select HPV order below)        pending    (Z30.42) Encounter for surveillance of injectable contraceptive  Plan: medroxyPROGESTERone (DEPO-PROVERA) injection         150 mg        Discussed: Smoking Cessation, and potential harms of long term depo provera use including osteoporosis. Patient is not eligible for combination hormone due to age and smoking status. Patient chooses to continue Depo-Provera today but will consider non-estrogen alternative such as Mirena IUD. Mirena IUD brochure given.                 I have discussed with patient the risks, benefits, medications, treatment options and modalities.   I have instructed the patient to call or schedule a follow-up appointment if any problems or failure to improve.    Pt seen and examined.    Agree with " the above.  RANDALLJ

## 2019-06-07 LAB
COPATH REPORT: NORMAL
PAP: NORMAL

## 2019-06-13 LAB
FINAL DIAGNOSIS: NORMAL
HPV HR 12 DNA CVX QL NAA+PROBE: NEGATIVE
HPV16 DNA SPEC QL NAA+PROBE: NEGATIVE
HPV18 DNA SPEC QL NAA+PROBE: NEGATIVE
SPECIMEN DESCRIPTION: NORMAL
SPECIMEN SOURCE CVX/VAG CYTO: NORMAL

## 2019-09-20 ENCOUNTER — TELEPHONE (OUTPATIENT)
Dept: FAMILY MEDICINE | Facility: OTHER | Age: 44
End: 2019-09-20

## 2019-09-20 DIAGNOSIS — Z30.42 ENCOUNTER FOR SURVEILLANCE OF INJECTABLE CONTRACEPTIVE: ICD-10-CM

## 2019-09-20 NOTE — PROGRESS NOTES
"Pt has appt in NP room on 2019 for Depo.  Noted order  in 2017.     6/3/2019 appt with OB  \"Discussed: Smoking Cessation, and potential harms of long term depo provera use including osteoporosis. Patient is not eligible for combination hormone due to age and smoking status. Patient chooses to continue Depo-Provera today but will consider non-estrogen alternative such as Mirena IUD. Mirena IUD brochure given.                 I have discussed with patient the risks, benefits, medications, treatment options and modalities.   I have instructed the patient to call or schedule a follow-up appointment if any problems or failure to improve     Pt seen and examined.    Agree with the above.  SWJ\"    Pt due for appt with PCP and new order needed-Pt also late for Depo (-) and placed in 30 min appt.     Attempted to Contacted Pt to schedule with PCP/OB-GYN.  No answer.  Left message to return call.    No return call noted.  Will route order for 1 dose to Dr. Fernandez in absence of Dr. Garfield Forbes.    Shaila Fernandez RN  ....................  2019   4:38 PM      "

## 2019-09-20 NOTE — TELEPHONE ENCOUNTER
"Pt has appt in NP room on 2019 for Depo.  Noted order  in .     6/3/2019 appt with OB  \"Discussed: Smoking Cessation, and potential harms of long term depo provera use including osteoporosis. Patient is not eligible for combination hormone due to age and smoking status. Patient chooses to continue Depo-Provera today but will consider non-estrogen alternative such as Mirena IUD. Mirena IUD brochure given.                 I have discussed with patient the risks, benefits, medications, treatment options and modalities.   I have instructed the patient to call or schedule a follow-up appointment if any problems or failure to improve     Pt seen and examined.    Agree with the above.  SWJ\"    Pt due for appt with PCP and new order needed-Pt also late for Depo (-) and placed in 30 min appt.     Attempted to Contacted Pt to schedule with PCP/OB-GYN.  No answer.  Left message to return call.    No return call noted.  Will route order to Dr. Fernandez in absence of Dr. Garfield Forbes.    Shaila Fernandez RN  ....................  2019   4:35 PM          "

## 2019-09-23 ENCOUNTER — ALLIED HEALTH/NURSE VISIT (OUTPATIENT)
Dept: FAMILY MEDICINE | Facility: OTHER | Age: 44
End: 2019-09-23
Attending: INTERNAL MEDICINE
Payer: COMMERCIAL

## 2019-09-23 DIAGNOSIS — Z30.42 ENCOUNTER FOR DEPO-PROVERA CONTRACEPTION: Primary | ICD-10-CM

## 2019-09-23 DIAGNOSIS — Z30.42 ENCOUNTER FOR SURVEILLANCE OF INJECTABLE CONTRACEPTIVE: ICD-10-CM

## 2019-09-23 LAB — HCG UR QL: NEGATIVE

## 2019-09-23 PROCEDURE — 96372 THER/PROPH/DIAG INJ SC/IM: CPT

## 2019-09-23 PROCEDURE — 25000128 H RX IP 250 OP 636: Performed by: OBSTETRICS & GYNECOLOGY

## 2019-09-23 PROCEDURE — 81025 URINE PREGNANCY TEST: CPT | Mod: ZL | Performed by: OBSTETRICS & GYNECOLOGY

## 2019-09-23 RX ORDER — MEDROXYPROGESTERONE ACETATE 150 MG/ML
150 INJECTION, SUSPENSION INTRAMUSCULAR ONCE
Status: DISCONTINUED | OUTPATIENT
Start: 2019-06-03 | End: 2019-09-23

## 2019-09-23 RX ORDER — MEDROXYPROGESTERONE ACETATE 150 MG/ML
150 INJECTION, SUSPENSION INTRAMUSCULAR
Status: COMPLETED | OUTPATIENT
Start: 2019-09-23 | End: 2020-05-18

## 2019-09-23 RX ADMIN — MEDROXYPROGESTERONE ACETATE 150 MG: 150 INJECTION, SUSPENSION INTRAMUSCULAR at 08:26

## 2019-09-23 NOTE — PROGRESS NOTES
Verified patient's first and last name, and . Patient stated reason for visit today is to receive Depo. Patient denied any concerns with previous injections. Weight assessed: 229#. BP assessed: 130/80    Patient requests ongoing injections of Depo-Provera  Date of last DMPA:  6/3/19. Late on receiving depo. HCG urine test completed: Negative  The following questions asked by nurse:   Adverse reaction to last shot?  no  Heavy bleeding or more than 14 days bleeding sincelast shot?  no  Wants to continue contraception for another 3 months, knowing that fertility may not return for up to 18 months?  yes  Recent migraine headaches?  no  Breast problems since last shot?  no  Problems with excessive hair loss, acne or facial hair?  no    Depo Provera injection prepared and administered IM into left deltoid as ordered. Administration of medication documented in MAR (see MAR for further information regarding dose, lot #, NDC #, expiration date). Patient encouraged to wait in lobby for 15 minutes post-injection and notify staff immediately of any reaction. Next Depo Provera injection in series due . Patient provided appointment reminder card.       Aurea GOSSN, RN on 2019 at 8:27 AM

## 2019-09-23 NOTE — PROGRESS NOTES
Patient presented to injection room for Depo-Provera injection, but order had . Injection nurse called this nurse to inquire about order. This nurse obtained a verbal order from Dr. Garfield Forbes MD. Clinic-administered Depo-Provera was refilled for one year from last visit.    Shelbie Hall RN...................2019 8:08 AM

## 2019-12-13 ENCOUNTER — ALLIED HEALTH/NURSE VISIT (OUTPATIENT)
Dept: FAMILY MEDICINE | Facility: OTHER | Age: 44
End: 2019-12-13
Attending: FAMILY MEDICINE
Payer: COMMERCIAL

## 2019-12-13 DIAGNOSIS — Z30.42 ENCOUNTER FOR DEPO-PROVERA CONTRACEPTION: Primary | ICD-10-CM

## 2019-12-13 PROCEDURE — 96372 THER/PROPH/DIAG INJ SC/IM: CPT

## 2019-12-13 PROCEDURE — 25000128 H RX IP 250 OP 636: Performed by: OBSTETRICS & GYNECOLOGY

## 2019-12-13 RX ADMIN — MEDROXYPROGESTERONE ACETATE 150 MG: 150 INJECTION, SUSPENSION INTRAMUSCULAR at 13:22

## 2019-12-13 NOTE — PROGRESS NOTES
Verified patient's first and last name, and . Patient stated reason for visit today is to receive Depo. Patient denied any concerns with previous injections. Weight assessed: 230.6#. BP assessed: 130/90.    Patient requests ongoing injections of Depo-Provera  Date of last DMPA:  19.    The following questions asked by nurse:   Adverse reaction to last shot?  no  Heavy bleeding or more than 14 days bleeding sincelast shot?  no  Wants to continue contraception for another 3 months, knowing that fertility may not return for up to 18 months?  yes  Recent migraine headaches?  no  Breast problems since last shot?  no  Problems with excessive hair loss, acne or facial hair?  no    Depo Provera injection prepared and administered IM as ordered. Administration of medication documented in MAR (see MAR for further information regarding dose, lot #, NDC #, expiration date). Next Depo Provera injection in series due  - 2020. Patient provided appointment reminder card.

## 2020-03-02 ENCOUNTER — ALLIED HEALTH/NURSE VISIT (OUTPATIENT)
Dept: FAMILY MEDICINE | Facility: OTHER | Age: 45
End: 2020-03-02
Attending: FAMILY MEDICINE
Payer: COMMERCIAL

## 2020-03-02 ENCOUNTER — HEALTH MAINTENANCE LETTER (OUTPATIENT)
Age: 45
End: 2020-03-02

## 2020-03-02 DIAGNOSIS — Z30.42 ENCOUNTER FOR DEPO-PROVERA CONTRACEPTION: Primary | ICD-10-CM

## 2020-03-02 PROCEDURE — 25000128 H RX IP 250 OP 636: Performed by: OBSTETRICS & GYNECOLOGY

## 2020-03-02 PROCEDURE — 96372 THER/PROPH/DIAG INJ SC/IM: CPT

## 2020-03-02 RX ADMIN — MEDROXYPROGESTERONE ACETATE 150 MG: 150 INJECTION, SUSPENSION INTRAMUSCULAR at 11:13

## 2020-03-02 NOTE — PROGRESS NOTES
Verified patient's name and . Patient stated reason for visit today is to receive Depo. Patient denied any concerns with previous injections. Weight assessed: 220.8#. BP assessed: 150/90.    Patient requests ongoing injections of Depo-Provera  Date of last DMPA:  2019    The following questions asked by nurse:   Adverse reaction to last shot?  no  Heavy bleeding or more than 14 days bleeding sincelast shot?  no  Wants to continue contraception for another 3 months, knowing that fertility may not return for up to 18 months?  yes  Recent migraine headaches?  no  Breast problems since last shot?  no  Problems with excessive hair loss, acne or facial hair?  no    Depo Provera injection prepared and administered IM as ordered. Administration of medication documented in MAR (see MAR for further information regarding dose, lot #, NDC #, expiration date). Next Depo Provera injection in series due May 18 - 2020. Patient provided appointment reminder card.     Aurea GOSSN, RN on 3/2/2020 at 11:14 AM

## 2020-05-18 ENCOUNTER — ALLIED HEALTH/NURSE VISIT (OUTPATIENT)
Dept: FAMILY MEDICINE | Facility: OTHER | Age: 45
End: 2020-05-18
Attending: FAMILY MEDICINE
Payer: COMMERCIAL

## 2020-05-18 VITALS — SYSTOLIC BLOOD PRESSURE: 130 MMHG | BODY MASS INDEX: 36.37 KG/M2 | WEIGHT: 239.2 LBS | DIASTOLIC BLOOD PRESSURE: 90 MMHG

## 2020-05-18 DIAGNOSIS — Z30.42 ENCOUNTER FOR DEPO-PROVERA CONTRACEPTION: Primary | ICD-10-CM

## 2020-05-18 PROCEDURE — 25000128 H RX IP 250 OP 636: Performed by: OBSTETRICS & GYNECOLOGY

## 2020-05-18 PROCEDURE — 96372 THER/PROPH/DIAG INJ SC/IM: CPT

## 2020-05-18 RX ADMIN — MEDROXYPROGESTERONE ACETATE 150 MG: 150 INJECTION, SUSPENSION INTRAMUSCULAR at 09:17

## 2020-05-18 NOTE — PROGRESS NOTES
Clinic Administered Medication Documentation    Depo Provera Documentation    URINE HCG: not indicated    Depo-Provera Standing Order inclusion/exclusion criteria reviewed.   Patient meets: inclusion criteria     BP: 130/90  LAST PAP/EXAM:   Lab Results   Component Value Date    PAP NIL 06/03/2019       Prior to injection, verified patient identity using patient's name and date of birth. Medication was administered. Please see MAR and medication order for additional information.     Was entire vial of medication used? Yes  Vial/Syringe: Single dose vial  Expiration Date:  10/2021  NEXT INJECTION DUE: 8/3/20 - 8/17/20    Appointment reminder card provided to patient.       Aurea GOSSN, RN on 5/18/2020 at 9:18 AM

## 2020-08-03 ENCOUNTER — TELEPHONE (OUTPATIENT)
Dept: OBGYN | Facility: OTHER | Age: 45
End: 2020-08-03

## 2020-08-03 DIAGNOSIS — Z30.42 ENCOUNTER FOR DEPO-PROVERA CONTRACEPTION: Primary | ICD-10-CM

## 2020-08-03 RX ORDER — MEDROXYPROGESTERONE ACETATE 150 MG/ML
150 INJECTION, SUSPENSION INTRAMUSCULAR
Status: COMPLETED | OUTPATIENT
Start: 2020-08-04 | End: 2021-06-29

## 2020-08-03 NOTE — TELEPHONE ENCOUNTER
Order Request for Clinic Administered Medication  Order renewal required for Depo Provera. Order charmaine'd up. Will route to OB/GYN for review and consideration.       Monisha Mota RN, BSN on 8/3/2020 at 2:43 PM

## 2020-08-04 ENCOUNTER — ALLIED HEALTH/NURSE VISIT (OUTPATIENT)
Dept: FAMILY MEDICINE | Facility: OTHER | Age: 45
End: 2020-08-04
Attending: FAMILY MEDICINE
Payer: COMMERCIAL

## 2020-08-04 VITALS — WEIGHT: 238 LBS | DIASTOLIC BLOOD PRESSURE: 74 MMHG | BODY MASS INDEX: 36.19 KG/M2 | SYSTOLIC BLOOD PRESSURE: 142 MMHG

## 2020-08-04 DIAGNOSIS — Z30.42 ENCOUNTER FOR DEPO-PROVERA CONTRACEPTION: Primary | ICD-10-CM

## 2020-08-04 PROCEDURE — 25000128 H RX IP 250 OP 636: Performed by: OBSTETRICS & GYNECOLOGY

## 2020-08-04 PROCEDURE — 96372 THER/PROPH/DIAG INJ SC/IM: CPT

## 2020-08-04 RX ADMIN — MEDROXYPROGESTERONE ACETATE 150 MG: 150 INJECTION, SUSPENSION INTRAMUSCULAR at 08:55

## 2020-08-04 NOTE — PROGRESS NOTES
Clinic Administered Medication Documentation    Depo Provera Documentation    URINE HCG: not indicated    Depo-Provera Standing Order inclusion/exclusion criteria reviewed.   Patient meets: inclusion criteria     BP: Data Unavailable  LAST PAP/EXAM:   Lab Results   Component Value Date    PAP NIL 06/03/2019       Prior to injection, verified patient identity using patient's name and date of birth. Medication was administered. Please see MAR and medication order for additional information.     Was entire vial of medication used? Yes  Vial/Syringe: Single dose vial  Expiration Date:  11/2021    NEXT INJECTION DUE: 10/20/20 - 11/3/20    Appointment reminder card provided to patient.

## 2020-10-23 ENCOUNTER — ALLIED HEALTH/NURSE VISIT (OUTPATIENT)
Dept: FAMILY MEDICINE | Facility: OTHER | Age: 45
End: 2020-10-23
Attending: FAMILY MEDICINE
Payer: COMMERCIAL

## 2020-10-23 VITALS — SYSTOLIC BLOOD PRESSURE: 134 MMHG | WEIGHT: 240 LBS | BODY MASS INDEX: 36.49 KG/M2 | DIASTOLIC BLOOD PRESSURE: 78 MMHG

## 2020-10-23 DIAGNOSIS — Z30.42 ENCOUNTER FOR DEPO-PROVERA CONTRACEPTION: Primary | ICD-10-CM

## 2020-10-23 PROCEDURE — 96372 THER/PROPH/DIAG INJ SC/IM: CPT | Performed by: OBSTETRICS & GYNECOLOGY

## 2020-10-23 PROCEDURE — 250N000011 HC RX IP 250 OP 636: Performed by: OBSTETRICS & GYNECOLOGY

## 2020-10-23 RX ADMIN — MEDROXYPROGESTERONE ACETATE 150 MG: 150 INJECTION, SUSPENSION INTRAMUSCULAR at 09:14

## 2020-10-23 NOTE — PROGRESS NOTES
Clinic Administered Medication Documentation    Depo Provera Documentation    URINE HCG: not indicated    Depo-Provera Standing Order inclusion/exclusion criteria reviewed.   Patient meets: inclusion criteria     BP: Data Unavailable  LAST PAP/EXAM:   Lab Results   Component Value Date    PAP NIL 06/03/2019       Prior to injection, verified patient identity using patient's name and date of birth. Medication was administered. Please see MAR and medication order for additional information.     Was entire vial of medication used? Yes  Vial/Syringe: Single dose vial  Expiration Date:  02/2022    Patient instructed to report any adverse reaction to staff immediately .  NEXT INJECTION DUE: 1/8/21 - 1/22/21    Appointment reminder card provided to patient.     Destinee Zaldivar RN on 10/23/2020 at 9:07 AM

## 2020-12-07 ENCOUNTER — VIRTUAL VISIT (OUTPATIENT)
Dept: FAMILY MEDICINE | Facility: OTHER | Age: 45
End: 2020-12-07

## 2020-12-07 ENCOUNTER — HOSPITAL ENCOUNTER (OUTPATIENT)
Dept: PHYSICAL THERAPY | Facility: OTHER | Age: 45
Setting detail: THERAPIES SERIES
End: 2020-12-07
Attending: INTERNAL MEDICINE
Payer: COMMERCIAL

## 2020-12-07 ENCOUNTER — OFFICE VISIT (OUTPATIENT)
Dept: FAMILY MEDICINE | Facility: OTHER | Age: 45
End: 2020-12-07
Attending: NURSE PRACTITIONER
Payer: COMMERCIAL

## 2020-12-07 VITALS
HEART RATE: 88 BPM | HEIGHT: 68 IN | WEIGHT: 234.8 LBS | OXYGEN SATURATION: 95 % | DIASTOLIC BLOOD PRESSURE: 88 MMHG | SYSTOLIC BLOOD PRESSURE: 138 MMHG | TEMPERATURE: 97.9 F | RESPIRATION RATE: 18 BRPM | BODY MASS INDEX: 35.58 KG/M2

## 2020-12-07 DIAGNOSIS — R42 SPINNING SENSATION: ICD-10-CM

## 2020-12-07 DIAGNOSIS — H81.10 BENIGN PAROXYSMAL POSITIONAL VERTIGO, UNSPECIFIED LATERALITY: ICD-10-CM

## 2020-12-07 DIAGNOSIS — H81.10 BENIGN PAROXYSMAL POSITIONAL VERTIGO, UNSPECIFIED LATERALITY: Primary | ICD-10-CM

## 2020-12-07 PROCEDURE — 99214 OFFICE O/P EST MOD 30 MIN: CPT | Performed by: INTERNAL MEDICINE

## 2020-12-07 PROCEDURE — 97162 PT EVAL MOD COMPLEX 30 MIN: CPT | Mod: GP | Performed by: PHYSICAL THERAPIST

## 2020-12-07 RX ORDER — MECLIZINE HCL 12.5 MG 12.5 MG/1
12.5-25 TABLET ORAL 3 TIMES DAILY PRN
Qty: 30 TABLET | Refills: 1 | Status: SHIPPED | OUTPATIENT
Start: 2020-12-07 | End: 2023-06-07

## 2020-12-07 ASSESSMENT — ENCOUNTER SYMPTOMS
FEVER: 0
HEMATURIA: 0
SHORTNESS OF BREATH: 0
CONFUSION: 0
DIZZINESS: 1
WOUND: 0
BRUISES/BLEEDS EASILY: 0
CHILLS: 0
ABDOMINAL PAIN: 0
LIGHT-HEADEDNESS: 0

## 2020-12-07 ASSESSMENT — PAIN SCALES - GENERAL: PAINLEVEL: MODERATE PAIN (5)

## 2020-12-07 ASSESSMENT — MIFFLIN-ST. JEOR: SCORE: 1763.55

## 2020-12-07 NOTE — PROGRESS NOTES
"Nursing Notes:   Van Marinelli LPN  12/7/2020 11:05 AM  Signed  Chief Complaint   Patient presents with     Ear Problem     Right ear pain and dizziness started yesterday. She states that in the past when she has had ear infections that she gets dizzy and has ear pain.     Initial There were no vitals taken for this visit. Estimated body mass index is 36.49 kg/m  as calculated from the following:    Height as of 6/3/19: 1.727 m (5' 8\").    Weight as of 10/23/20: 108.9 kg (240 lb).    Medication Reconciliation: complete      Van Marinelli LPN    Nursing note reviewed with patient.  Accuracy and completeness verified.  Ms. Mendez is a 45 year old female who presents to [facility]:  Patient presents with:  Ear Problem      ICD-10-CM    1. Benign paroxysmal positional vertigo, unspecified laterality  H81.10 meclizine (ANTIVERT) 12.5 MG tablet     PHYSICAL THERAPY REFERRAL   2. Spinning sensation  R42 meclizine (ANTIVERT) 12.5 MG tablet     PHYSICAL THERAPY REFERRAL     HPI  Last week patient had a couple episodes of some balance issues that happened acutely.  States that the seem to happen if she turned abruptly while at work.  Last night she started having some pain in her right ear.  Having some dizzy spells and imbalance.  Seems to be mostly when she changes position to happen quite acutely did not last very long and go away rather abruptly.  Most of the issues are due to spinning sensation.    She reports that she had to prop up in the bed last night to help with some of her ear pain.    She has had ear infections in the past and is worried she might have an ear infection again today.  Mostly her right ear has had some pain.    Her second thought is may be she has some vertigo issues.    Based on history and exam, appears to have spinning sensation due to benign positional vertigo.  Physical therapy referral sent.  Start meclizine as needed for symptoms.    Outpatient follow-up as needed for new or " "worsening symptoms.    Review of Systems   Constitutional: Negative for chills and fever.   HENT: Positive for ear pain (right, mild). Negative for congestion and sneezing.    Respiratory: Negative for shortness of breath.    Cardiovascular: Negative for chest pain.   Gastrointestinal: Negative for abdominal pain.   Genitourinary: Negative for hematuria.   Musculoskeletal: Negative for gait problem.   Skin: Negative for rash and wound.   Neurological: Positive for dizziness (intermittent, acute with change of position / turning abruptly). Negative for syncope and light-headedness.   Hematological: Does not bruise/bleed easily.   Psychiatric/Behavioral: Negative for confusion.        Reviewed and updated as needed this visit by Provider   Tobacco  Allergies  Meds  Problems  Med Hx  Surg Hx  Fam Hx          EXAM:   Vitals:    12/07/20 1044   BP: 138/88   Pulse: 88   Resp: 18   Temp: 97.9  F (36.6  C)   TempSrc: Tympanic   SpO2: 95%   Weight: 106.5 kg (234 lb 12.8 oz)   Height: 1.727 m (5' 8\")       Current Pain Score: Moderate Pain (5)     BP Readings from Last 3 Encounters:   12/07/20 138/88   10/23/20 134/78   08/04/20 (!) 142/74      Wt Readings from Last 3 Encounters:   12/07/20 106.5 kg (234 lb 12.8 oz)   10/23/20 108.9 kg (240 lb)   08/04/20 108 kg (238 lb)      Estimated body mass index is 35.7 kg/m  as calculated from the following:    Height as of this encounter: 1.727 m (5' 8\").    Weight as of this encounter: 106.5 kg (234 lb 12.8 oz).     Physical Exam  Constitutional:       Appearance: Normal appearance. She is not ill-appearing.   HENT:      Head: Normocephalic and atraumatic.      Right Ear: Tympanic membrane, ear canal and external ear normal. There is no impacted cerumen.      Left Ear: Tympanic membrane, ear canal and external ear normal. There is no impacted cerumen.   Eyes:      General: No scleral icterus.     Conjunctiva/sclera: Conjunctivae normal.   Cardiovascular:      Rate and Rhythm: " Normal rate.      Pulses: Normal pulses.   Pulmonary:      Effort: Pulmonary effort is normal.   Musculoskeletal:         General: No deformity.   Lymphadenopathy:      Cervical: No cervical adenopathy.   Skin:     General: Skin is warm and dry.      Findings: No rash.   Neurological:      Mental Status: She is alert and oriented to person, place, and time. Mental status is at baseline.   Psychiatric:         Mood and Affect: Mood normal.         Behavior: Behavior normal.        Procedures   INVESTIGATIONS:  - Labs reviewed in [facility]     ASSESSMENT AND PLAN:  Problem List Items Addressed This Visit     None      Visit Diagnoses     Benign paroxysmal positional vertigo, unspecified laterality    -  Primary    Relevant Medications    meclizine (ANTIVERT) 12.5 MG tablet    Other Relevant Orders    PHYSICAL THERAPY REFERRAL    Spinning sensation        Relevant Medications    meclizine (ANTIVERT) 12.5 MG tablet    Other Relevant Orders    PHYSICAL THERAPY REFERRAL        -- Expected clinical course discussed    -- Medications and their side effects discussed    Patient Instructions   1. Benign paroxysmal positional vertigo, unspecified laterality  2. Spinning sensation    START:   - meclizine (ANTIVERT) 12.5 MG tablet; Take 1-2 tablets (12.5-25 mg) by mouth 3 times daily as needed for dizziness  Dispense: 30 tablet; Refill: 1  - PHYSICAL THERAPY REFERRAL; Future    Physical therapy referral sent  - they will call with date/time of appointment.      -- Vertigo treatment.     Return as needed for follow-up for new / worsening symptoms.    Clinic : [phone]  Appointment line: [phone]     John Stockton MD   Internal Medicine  [facility]     Portions of this note were dictated using speech recognition software. The note has been proofread but errors in the text may have been overlooked. Please contact me if there are any concerns regarding the accuracy of the dictation.

## 2020-12-07 NOTE — PROGRESS NOTES
"Date: 2020 09:47:11  Clinician: Stephen Valencia  Clinician NPI: [id number]  Patient: Gala Mendez  Patient : 1975  Patient Address: [place] [address], [address] [place] [address]  Patient Phone: [phone]  Visit Protocol: Ear pain  Patient Summary:  Gala is a 45 year old ( : 1975 ) female who initiated a [facility] Visit for swimmer's ear (outer ear infection). When asked the question \"Please sign me up to receive news, health information and promotions from [facility].\", Gala responded \"No\".   A synchronous visit is necessary because the patient reported the following abnormal symptoms:   Bilateral ear pain (requires clarification)    Severe ear pain (7-9 on a 10 point pain scale, requires clarification)   Gala reports that her ear pain started 2-4 days ago. The ear pain is located outside (external surface) and inside both ears.   In addition to the ear pain, Gala is experiencing itchiness, a feeling of fullness, and tenderness in the ear(s). Her ear(s) is tender to the touch on the ear canal, tragus, and mastoid process. Gala reports having fluid draining from the ear(s). She feels feverish but she was unable to measure her temperature.   Symptom Details     Pain: Gala is experiencing severe pain (7-9 on a 10 point pain scale). It gets worse when she eats or chews. It does not get worse when she gently pulls on the earlobe(s).     Drainage: The color of the fluid coming out of her ear(s) is yellow. The fluid is malodorous.      Gala denies redness in the ear(s). She also denies the possibility of a foreign object in the ear(s), ever having ear tubes, and recent injuries near the ear(s).   Precipitating events   Gala denies flying and swimming within the past week.   Pertinent medical history  She   Gala denies having immunosuppressive conditions (e.g., chemotherapy, HIV, organ transplant, long-term use of steroids or other immunosuppressive medications, splenectomy).   " Gala does not have diabetes.   Weight: 240 lbs   Gala smokes or uses smokeless tobacco.   She denies pregnancy and denies breastfeeding. She does not menstruate.   Additional health information pertinent to this [facility] Visit as reported by the patient (free text): Feeling dizziness and lightheaded   Weight: 240 lbs    MEDICATIONS: No current medications, ALLERGIES: NKDA  Clinician Response:  Dear Gala,   I am sorry you are not feeling well. Additional information was needed to clarify some of the details provided during your [facility] Visit. Because I was unable to reach you, I would like you to be seen in person to further discuss your health history and symptoms so you get the most appropriate care for you.  You will not be charged for this [facility] Visit. Thank you for trusting us with your care.   Diagnosis: Unable to contact patient  Diagnosis ICD: R69  Synchronous Triage: phone, status: incomplete, duration: 0 seconds

## 2020-12-07 NOTE — PATIENT INSTRUCTIONS
1. Benign paroxysmal positional vertigo, unspecified laterality  2. Spinning sensation    START:   - meclizine (ANTIVERT) 12.5 MG tablet; Take 1-2 tablets (12.5-25 mg) by mouth 3 times daily as needed for dizziness  Dispense: 30 tablet; Refill: 1  - PHYSICAL THERAPY REFERRAL; Future    Physical therapy referral sent  - they will call with date/time of appointment.      -- Vertigo treatment.     Return as needed for follow-up for new / worsening symptoms.    Clinic : [phone]  Appointment line: [phone]

## 2020-12-07 NOTE — PROGRESS NOTES
"   12/07/20 1200   Quick Adds   Quick Adds Vestibular Eval   Type of Visit Initial OP PT Evaluation   General Information   Start of Care Date 12/07/20   Referring Physician Dr. Stockton   Orders Evaluate and Treat as Indicated   Order Date 12/07/20   Medical Diagnosis BPPV, spinning sensation   Onset of illness/injury or Date of Surgery 11/30/20   Precautions/Limitations no known precautions/limitations   Surgical/Medical history reviewed Yes   Pertinent history of current problem (include personal factors and/or comorbidities that impact the POC) Brief occasioan episodes of dizziness started last week at work.  Over the weekend had an increase in intensity and duration of dizziness.  Did vomit this morning.  Doesn't really feel spinning when sitting upright, but did when laying flat when looking up at ceiling fan and light.  Less when rolled onto right or left side (equal bilaterally).  Seemed to be a little less after about 10 minutes.  Was seen by MD this morning.  Filled prescription for meclizine, but hasn't taken any yet.  Had similar symptoms in the past when she had an ear infection.  Was treated for infection and everything went back to normal.     Pertinent Visual History  Denies changes in vision or hearing.     Patient role/Employment history Employed  (full-time; clinical program therapist (various duties))   Living environment [facility]   Home/Community Accessibility Comments Able to complete self cares and household tasks, but feels \"off\".  Has to hold onto railing in stairway.   Fall Risk Screen   Fall screen completed by PT   Have you fallen 2 or more times in the past year? No   Have you fallen and had an injury in the past year? No   Is patient a fall risk? Yes   Fall screen comments Feels unsteady when walking.   Observation   Observation Patient demonstrates an occasional cervical tremor.  When this was brought to the patient's attention, she denied this having been a previous problem and " "states she didn't notice she was even moving her head.   Oculomotor Exam   Smooth Pursuit Abnormal   Smooth Pursuit Comment impaired; occasional saccadic intrustions   Saccades Abnormal   Saccades Comments impaired accuracy, frequently more than 1-2 eye motion to target   VOR Abnormal   VOR Comments 2-3 nystagmus after stopping horizontal head motion; No symptoms after vertical head motion.   VOR Cancellation Normal   Rapid Head Thrust Normal   Convergence Testing Normal   Convergence Testing Comments L eye drifts away at approximately 6\"   Infrared Goggle Exam or Frenzel Lense Exam   Vestibular Suppressant in Last 24 Hours? No   Exam completed with Frenzel Lenses   Spontaneous Nystagmus Negative   Gaze Evoked Nystagmus Negative   Head Shake Horizontal Nystagmus Negative   Eli-Hallpike (right) Negative   Eli-Hallpike (Left) Negative   HSCC Supine Roll Test (Right) Negative   HSCC Supine Roll Test (Left) Negative   Planned Therapy Interventions   Planned Therapy Interventions balance training;neuromuscular re-education;visual perception  (vestibular therapy)   Clinical Impression   Criteria for Skilled Therapeutic Interventions Met yes, treatment indicated   PT Diagnosis impaired mobility   Influenced by the following impairments impaired balance, impaired vestibular function, dizziness   Functional limitations due to impairments transfers, standing, walking, laying supine/sleep, self-cares, household and work tasks   Clinical Presentation Evolving/Changing   Clinical Presentation Rationale clinical observation   Clinical Decision Making (Complexity) Moderate complexity   Therapy Frequency 2 times/Week   Predicted Duration of Therapy Intervention (days/wks) 8 visits   Risk & Benefits of therapy have been explained Yes   Patient, Family & other staff in agreement with plan of care Yes   Clinical Impression Comments Negative positional testing today.  Signs indicate possible central vestibular dysfunction.  Patient " would benefit from repeated positional testing and reassessment of occulomotor testing as well.  Will continue to monitor symptoms over the next couple of days until able to be seen.   GOALS   PT Eval Goals 1;2;3   Goal 1   Goal Identifier walking   Goal Description Patient will be able to safely walk community level distances at functional pace for age (3.53 ft/sec or more) for ability to complete home and work tasks.   Target Date 01/04/21   Goal 2   Goal Identifier laying flat   Goal Description Patient will be able to lay supine with no limitation due to feeling of spinning for improved sleep quality.   Target Date 01/04/21   Goal 3   Goal Identifier head motion   Goal Description Patient will be able to look R/L with no limitation due to sensation of spinning or instability while standing and walking for improved functional mobility.   Target Date 01/04/21   Total Evaluation Time   PT Eval, Moderate Complexity Minutes (67122) 25

## 2020-12-07 NOTE — NURSING NOTE
"Chief Complaint   Patient presents with     Ear Problem     Right ear pain and dizziness started yesterday. She states that in the past when she has had ear infections that she gets dizzy and has ear pain.     Initial There were no vitals taken for this visit. Estimated body mass index is 36.49 kg/m  as calculated from the following:    Height as of 6/3/19: 1.727 m (5' 8\").    Weight as of 10/23/20: 108.9 kg (240 lb).    Medication Reconciliation: complete      Van Marinelli LPN  "

## 2020-12-11 ENCOUNTER — HOSPITAL ENCOUNTER (OUTPATIENT)
Dept: PHYSICAL THERAPY | Facility: OTHER | Age: 45
Setting detail: THERAPIES SERIES
End: 2020-12-11
Attending: INTERNAL MEDICINE
Payer: COMMERCIAL

## 2020-12-11 PROCEDURE — 97110 THERAPEUTIC EXERCISES: CPT | Mod: GP | Performed by: PHYSICAL THERAPIST

## 2020-12-20 ENCOUNTER — HEALTH MAINTENANCE LETTER (OUTPATIENT)
Age: 45
End: 2020-12-20

## 2021-01-13 NOTE — PROGRESS NOTES
"Outpatient Physical Therapy Discharge Note     Patient: Gala Mendez  : 1975    Beginning/End Dates of Reporting Period:  20 to 2021    Referring Provider: Dr. Stockton    Therapy Diagnosis: BPPV, spinning sensation       Client Self Report: NA  Patient was last seen 20.  Cancelled remaining appointments reporting symptom improvement, but requested chart remain \"open\".       Objective Measurements:  NA      Goals:  Unable to reassess due to unplanned discharge.  Goal Identifier walking   Goal Description Patient will be able to safely walk community level distances at functional pace for age (3.53 ft/sec or more) for ability to complete home and work tasks.   Target Date 21   Date Met      Progress:     Goal Identifier laying flat   Goal Description Patient will be able to lay supine with no limitation due to feeling of spinning for improved sleep quality.   Target Date 21   Date Met      Progress:     Goal Identifier head motion   Goal Description Patient will be able to look R/L with no limitation due to sensation of spinning or instability while standing and walking for improved functional mobility.   Target Date 21   Date Met      Progress:         Progress Toward Goals:   Patient was seen 2 visits.  Called to report improved symptoms, and cancelled remaining visits.  Was issued a HEP on 2nd visit.  Has not called to reschedule.         Plan:  Discharge from therapy.    Discharge:    Reason for Discharge: Patient has not been seen in greater than 30 days.    Equipment Issued: none    Discharge Plan: none  "

## 2021-01-18 ENCOUNTER — ALLIED HEALTH/NURSE VISIT (OUTPATIENT)
Dept: FAMILY MEDICINE | Facility: OTHER | Age: 46
End: 2021-01-18
Payer: COMMERCIAL

## 2021-01-18 VITALS — BODY MASS INDEX: 35.97 KG/M2 | WEIGHT: 236.6 LBS | DIASTOLIC BLOOD PRESSURE: 90 MMHG | SYSTOLIC BLOOD PRESSURE: 140 MMHG

## 2021-01-18 DIAGNOSIS — Z30.42 ENCOUNTER FOR DEPO-PROVERA CONTRACEPTION: Primary | ICD-10-CM

## 2021-01-18 PROCEDURE — 96372 THER/PROPH/DIAG INJ SC/IM: CPT | Performed by: OBSTETRICS & GYNECOLOGY

## 2021-01-18 PROCEDURE — 250N000011 HC RX IP 250 OP 636: Performed by: OBSTETRICS & GYNECOLOGY

## 2021-01-18 RX ADMIN — MEDROXYPROGESTERONE ACETATE 150 MG: 150 INJECTION, SUSPENSION INTRAMUSCULAR at 09:30

## 2021-01-18 NOTE — PROGRESS NOTES
BP: 140/90    LAST PAP/EXAM:   Lab Results   Component Value Date    PAP NIL 06/03/2019     URINE HCG:not indicated    The following medication was given:     MEDICATION: Depo Provera 150mg  ROUTE: IM  SITE: Ventrogluteal - Left  NEXT INJECTION DUE: 4/5/21 - 4/19/21   Provider: [facility]    Kathleen Giles RN  ....................  1/18/2021   9:34 AM

## 2021-03-13 ENCOUNTER — ALLIED HEALTH/NURSE VISIT (OUTPATIENT)
Dept: FAMILY MEDICINE | Facility: OTHER | Age: 46
End: 2021-03-13
Attending: FAMILY MEDICINE
Payer: COMMERCIAL

## 2021-03-13 DIAGNOSIS — R51.9 HEADACHE: Primary | ICD-10-CM

## 2021-03-13 PROCEDURE — C9803 HOPD COVID-19 SPEC COLLECT: HCPCS

## 2021-03-13 PROCEDURE — 87635 SARS-COV-2 COVID-19 AMP PRB: CPT | Mod: ZL | Performed by: FAMILY MEDICINE

## 2021-03-13 NOTE — PROCEDURES
Date of Admission: 6/25/19    Date of Discharge: 6/27/19    Admitting Physician: Cirilo Miranda MD    Consultations: Hospitalist Service    Admitting Diagnosis: Left Hip Osteoarthritis    Discharge Diagnosis: Left Hip Osteoarthritis    Surgical Procedures Performed: Left Direct Anterior PATRICK    Hospital Complications: None    Discharge Medications: No Changes to home meds; Oxycodone 5-10 mg po every 4 hours added for pain control;  BID for DVT prophylaxis    Discharge Disposition: Home    Discharge Diet: As at home    Discharge Activity: WBAT Left Lower Extremity; No Hip ROM precautions    Hospital Course:   The patient underwent a Left Direct Anterior PATRICK.  There were no intraoperative or immediate post operative complications.  Once stable in the PACU they were transferred to the hospital floor where they remained for the remainder of the hospital stay.  Pain management transitioned from IV to oral pain medications.  Physical/Occupational Therapy was consulted for early mobility and gait training as well as ADL training.  DVT prophylaxis was initiated on POD #1.  Vital signs and hemoglobin remained stable. The Hospitalist team was consulted for management of medical co-morbidities.  At time of discharge the patient was medically stable and cleared by Therapy for safe discharge home.        Follow-Up: 10-14 days [facility]    Questions: Call [phone] or [phone]   Patient swabbed for COVID-19 testing.  Lola Thompson LPN on 3/13/2021 at 2:20 PM    Headache

## 2021-03-14 LAB
LABORATORY COMMENT REPORT: NORMAL
SARS-COV-2 RNA RESP QL NAA+PROBE: NEGATIVE
SARS-COV-2 RNA RESP QL NAA+PROBE: NORMAL
SPECIMEN SOURCE: NORMAL
SPECIMEN SOURCE: NORMAL

## 2021-04-08 ENCOUNTER — ALLIED HEALTH/NURSE VISIT (OUTPATIENT)
Dept: FAMILY MEDICINE | Facility: OTHER | Age: 46
End: 2021-04-08
Attending: FAMILY MEDICINE
Payer: COMMERCIAL

## 2021-04-08 VITALS — DIASTOLIC BLOOD PRESSURE: 78 MMHG | BODY MASS INDEX: 36.89 KG/M2 | SYSTOLIC BLOOD PRESSURE: 132 MMHG | WEIGHT: 242.6 LBS

## 2021-04-08 DIAGNOSIS — Z30.42 ENCOUNTER FOR DEPO-PROVERA CONTRACEPTION: Primary | ICD-10-CM

## 2021-04-08 PROCEDURE — 96372 THER/PROPH/DIAG INJ SC/IM: CPT | Performed by: OBSTETRICS & GYNECOLOGY

## 2021-04-08 PROCEDURE — 250N000011 HC RX IP 250 OP 636: Performed by: OBSTETRICS & GYNECOLOGY

## 2021-04-08 RX ADMIN — MEDROXYPROGESTERONE ACETATE 150 MG: 150 INJECTION, SUSPENSION INTRAMUSCULAR at 14:44

## 2021-04-08 NOTE — PROGRESS NOTES
Clinic Administered Medication Documentation     Depo Provera Documentation     URINE HCG:not indicated     Depo-Provera Standing Order inclusion/exclusion criteria reviewed.   Patient meets: inclusion criteria    BP: 132/78  LAST PAP/EXAM:   Lab Results   Component Value Date    PAP NIL 06/03/2019       Prior to injection, verified patient identity using patient's name and date of birth. Medication was administered. Please see MAR and medication order for additional information.     Was entire vial of medication used? Yes  Vial/Syringe: Single dose vial  Expiration Date:  09/01/2022    Patient instructed to remain in clinic for 15 minutes and report any adverse reaction to staff immediately .  NEXT INJECTION DUE: 6/24/21 - 7/8/21      Kymberly Gross RN on 4/8/2021 at 2:53 PM

## 2021-04-18 ENCOUNTER — HEALTH MAINTENANCE LETTER (OUTPATIENT)
Age: 46
End: 2021-04-18

## 2021-06-29 ENCOUNTER — ALLIED HEALTH/NURSE VISIT (OUTPATIENT)
Dept: FAMILY MEDICINE | Facility: OTHER | Age: 46
End: 2021-06-29
Attending: FAMILY MEDICINE
Payer: COMMERCIAL

## 2021-06-29 VITALS — SYSTOLIC BLOOD PRESSURE: 120 MMHG | BODY MASS INDEX: 36.98 KG/M2 | WEIGHT: 243.2 LBS | DIASTOLIC BLOOD PRESSURE: 70 MMHG

## 2021-06-29 DIAGNOSIS — Z30.42 ENCOUNTER FOR DEPO-PROVERA CONTRACEPTION: Primary | ICD-10-CM

## 2021-06-29 PROCEDURE — 96372 THER/PROPH/DIAG INJ SC/IM: CPT | Performed by: OBSTETRICS & GYNECOLOGY

## 2021-06-29 PROCEDURE — 250N000011 HC RX IP 250 OP 636: Performed by: OBSTETRICS & GYNECOLOGY

## 2021-06-29 RX ADMIN — MEDROXYPROGESTERONE ACETATE 150 MG: 150 INJECTION, SUSPENSION INTRAMUSCULAR at 08:43

## 2021-06-29 NOTE — PROGRESS NOTES
Patient requests ongoing injections of Depo-Provera      Verified patient's first and last name, and . Patient stated reason for visit today is to receive a Depo Provera injection. Patient denied any concerns with previous injections.     BP: 120/70  WEIGHT: 243.2#  LAST PAP/EXAM:   Lab Results   Component Value Date    PAP NIL 2019     URINE HCG:not indicated    The following medication was given:   MEDICATION: Depo Provera 150mg  ROUTE: IM  SITE: Deltoid - Right    Depo Provera injection prepared and administered IM as ordered. Administration of medication documented in MAR (see MAR for further information regarding dose, lot #, NDC #, expiration date). Patient encouraged to wait in lobby for 15 minutes post-injection and notify staff immediately of any reaction. Next Depo Provera injection in series due NEXT INJECTION DUE: 21 - 21 . Patient provided appointment reminder card.       Kathleen Giles RN ....................  2021   8:43 AM

## 2021-07-01 ENCOUNTER — HOSPITAL ENCOUNTER (OUTPATIENT)
Dept: MAMMOGRAPHY | Facility: OTHER | Age: 46
Discharge: HOME | End: 2021-07-01
Attending: FAMILY MEDICINE | Admitting: FAMILY MEDICINE
Payer: COMMERCIAL

## 2021-07-01 DIAGNOSIS — Z12.31 VISIT FOR SCREENING MAMMOGRAM: ICD-10-CM

## 2021-07-01 PROCEDURE — 77063 BREAST TOMOSYNTHESIS BI: CPT

## 2021-09-13 DIAGNOSIS — Z30.42 ENCOUNTER FOR DEPO-PROVERA CONTRACEPTION: Primary | ICD-10-CM

## 2021-09-13 RX ORDER — MEDROXYPROGESTERONE ACETATE 150 MG/ML
150 INJECTION, SUSPENSION INTRAMUSCULAR
Status: DISCONTINUED | OUTPATIENT
Start: 2021-09-13 | End: 2023-01-13

## 2021-09-14 ENCOUNTER — ALLIED HEALTH/NURSE VISIT (OUTPATIENT)
Dept: FAMILY MEDICINE | Facility: OTHER | Age: 46
End: 2021-09-14
Attending: FAMILY MEDICINE
Payer: COMMERCIAL

## 2021-09-14 VITALS — WEIGHT: 243.8 LBS | DIASTOLIC BLOOD PRESSURE: 80 MMHG | SYSTOLIC BLOOD PRESSURE: 120 MMHG | BODY MASS INDEX: 37.07 KG/M2

## 2021-09-14 DIAGNOSIS — Z30.42 ENCOUNTER FOR DEPO-PROVERA CONTRACEPTION: Primary | ICD-10-CM

## 2021-09-14 PROCEDURE — 250N000011 HC RX IP 250 OP 636: Performed by: FAMILY MEDICINE

## 2021-09-14 PROCEDURE — 96372 THER/PROPH/DIAG INJ SC/IM: CPT | Performed by: FAMILY MEDICINE

## 2021-09-14 RX ADMIN — MEDROXYPROGESTERONE ACETATE 150 MG: 150 INJECTION, SUSPENSION INTRAMUSCULAR at 16:05

## 2021-09-14 NOTE — PROGRESS NOTES
Patient requests ongoing injections of Depo-Provera      Verified patient's first and last name, and . Patient stated reason for visit today is to receive a Depo Provera injection. Patient denied any concerns with previous injections.     LAST PAP/EXAM:   Lab Results   Component Value Date    PAP NIL 2019     URINE HCG:not indicated    The following medication was given:   MEDICATION: Depo Provera 150mg  ROUTE: IM  SITE: Deltoid - Left    Depo Provera injection prepared and administered IM as ordered. Administration of medication documented in MAR (see MAR for further information regarding dose, lot #, NDC #, expiration date). Patient encouraged to wait in lobby for 15 minutes post-injection and notify staff immediately of any reaction. Next Depo Provera injection in series due NEXT INJECTION DUE: 21 - 21 . Patient provided appointment reminder card.       Kathleen Giles RN ....................  2021   4:06 PM

## 2021-10-03 ENCOUNTER — HEALTH MAINTENANCE LETTER (OUTPATIENT)
Age: 46
End: 2021-10-03

## 2021-12-03 ENCOUNTER — ALLIED HEALTH/NURSE VISIT (OUTPATIENT)
Dept: FAMILY MEDICINE | Facility: OTHER | Age: 46
End: 2021-12-03
Attending: FAMILY MEDICINE
Payer: COMMERCIAL

## 2021-12-03 DIAGNOSIS — Z30.42 ENCOUNTER FOR DEPO-PROVERA CONTRACEPTION: Primary | ICD-10-CM

## 2021-12-03 PROCEDURE — 96372 THER/PROPH/DIAG INJ SC/IM: CPT | Performed by: FAMILY MEDICINE

## 2021-12-03 PROCEDURE — 250N000011 HC RX IP 250 OP 636: Performed by: FAMILY MEDICINE

## 2021-12-03 RX ADMIN — MEDROXYPROGESTERONE ACETATE 150 MG: 150 INJECTION, SUSPENSION INTRAMUSCULAR at 15:01

## 2021-12-03 NOTE — PROGRESS NOTES
Patient requests ongoing injections of Depo-Provera      Verified patient's first and last name, and . Patient stated reason for visit today is to receive a Depo Provera injection. Patient denied any concerns with previous injections.       LAST PAP/EXAM:   Lab Results   Component Value Date    PAP NIL 2019     URINE HCG:not indicated    The following medication was given:   MEDICATION: Depo Provera 150mg  ROUTE: IM  SITE: Deltoid - Right    Depo Provera injection prepared and administered IM as ordered. Administration of medication documented in MAR (see MAR for further information regarding dose, lot #, NDC #, expiration date). Patient encouraged to wait in lobby for 15 minutes post-injection and notify staff immediately of any reaction. Next Depo Provera injection in series due NEXT INJECTION DUE: 22 - 3/4/22 . Patient provided appointment reminder card.       Kathleen Giles RN ....................  12/3/2021   2:56 PM

## 2022-02-18 ENCOUNTER — ALLIED HEALTH/NURSE VISIT (OUTPATIENT)
Dept: FAMILY MEDICINE | Facility: OTHER | Age: 47
End: 2022-02-18
Attending: FAMILY MEDICINE
Payer: COMMERCIAL

## 2022-02-18 DIAGNOSIS — Z30.42 ENCOUNTER FOR DEPO-PROVERA CONTRACEPTION: Primary | ICD-10-CM

## 2022-02-18 PROCEDURE — 96372 THER/PROPH/DIAG INJ SC/IM: CPT | Performed by: FAMILY MEDICINE

## 2022-02-18 PROCEDURE — 250N000011 HC RX IP 250 OP 636: Performed by: FAMILY MEDICINE

## 2022-02-18 RX ADMIN — MEDROXYPROGESTERONE ACETATE 150 MG: 150 INJECTION, SUSPENSION INTRAMUSCULAR at 09:41

## 2022-04-19 ENCOUNTER — HOSPITAL ENCOUNTER (EMERGENCY)
Facility: OTHER | Age: 47
Discharge: HOME | End: 2022-04-19
Attending: EMERGENCY MEDICINE | Admitting: EMERGENCY MEDICINE
Payer: COMMERCIAL

## 2022-04-19 VITALS
SYSTOLIC BLOOD PRESSURE: 159 MMHG | HEART RATE: 87 BPM | TEMPERATURE: 98.1 F | WEIGHT: 243 LBS | BODY MASS INDEX: 36.95 KG/M2 | DIASTOLIC BLOOD PRESSURE: 119 MMHG | RESPIRATION RATE: 16 BRPM | OXYGEN SATURATION: 96 %

## 2022-04-19 DIAGNOSIS — K92.0 HEMATEMESIS, PRESENCE OF NAUSEA NOT SPECIFIED: ICD-10-CM

## 2022-04-19 DIAGNOSIS — K62.5 BRBPR (BRIGHT RED BLOOD PER RECTUM): ICD-10-CM

## 2022-04-19 DIAGNOSIS — I10 HYPERTENSION, UNSPECIFIED TYPE: ICD-10-CM

## 2022-04-19 LAB
ALBUMIN SERPL-MCNC: 4.4 G/DL (ref 3.5–5.7)
ALBUMIN UR-MCNC: NEGATIVE MG/DL
ALP SERPL-CCNC: 70 U/L (ref 34–104)
ALT SERPL W P-5'-P-CCNC: 12 U/L (ref 7–52)
ANION GAP SERPL CALCULATED.3IONS-SCNC: 9 MMOL/L (ref 3–14)
APPEARANCE UR: CLEAR
AST SERPL W P-5'-P-CCNC: 16 U/L (ref 13–39)
BASOPHILS # BLD AUTO: 0.1 10E3/UL (ref 0–0.2)
BASOPHILS NFR BLD AUTO: 1 %
BILIRUB SERPL-MCNC: 0.8 MG/DL (ref 0.3–1)
BILIRUB UR QL STRIP: NEGATIVE
BUN SERPL-MCNC: 11 MG/DL (ref 7–25)
CALCIUM SERPL-MCNC: 9.9 MG/DL (ref 8.6–10.3)
CHLORIDE BLD-SCNC: 103 MMOL/L (ref 98–107)
CO2 SERPL-SCNC: 26 MMOL/L (ref 21–31)
COLOR UR AUTO: ABNORMAL
CREAT SERPL-MCNC: 0.73 MG/DL (ref 0.6–1.2)
EOSINOPHIL # BLD AUTO: 0.1 10E3/UL (ref 0–0.7)
EOSINOPHIL NFR BLD AUTO: 1 %
ERYTHROCYTE [DISTWIDTH] IN BLOOD BY AUTOMATED COUNT: 11.9 % (ref 10–15)
GFR SERPL CREATININE-BSD FRML MDRD: >90 ML/MIN/1.73M2
GLUCOSE BLD-MCNC: 118 MG/DL (ref 70–105)
GLUCOSE UR STRIP-MCNC: NEGATIVE MG/DL
HCT VFR BLD AUTO: 49.7 % (ref 35–47)
HGB BLD-MCNC: 17.4 G/DL (ref 11.7–15.7)
HGB UR QL STRIP: NEGATIVE
IMM GRANULOCYTES # BLD: 0 10E3/UL
IMM GRANULOCYTES NFR BLD: 0 %
KETONES UR STRIP-MCNC: NEGATIVE MG/DL
LEUKOCYTE ESTERASE UR QL STRIP: NEGATIVE
LYMPHOCYTES # BLD AUTO: 1.7 10E3/UL (ref 0.8–5.3)
LYMPHOCYTES NFR BLD AUTO: 18 %
MCH RBC QN AUTO: 31 PG (ref 26.5–33)
MCHC RBC AUTO-ENTMCNC: 35 G/DL (ref 31.5–36.5)
MCV RBC AUTO: 89 FL (ref 78–100)
MONOCYTES # BLD AUTO: 0.4 10E3/UL (ref 0–1.3)
MONOCYTES NFR BLD AUTO: 4 %
MUCOUS THREADS #/AREA URNS LPF: PRESENT /LPF
NEUTROPHILS # BLD AUTO: 6.8 10E3/UL (ref 1.6–8.3)
NEUTROPHILS NFR BLD AUTO: 76 %
NITRATE UR QL: NEGATIVE
NRBC # BLD AUTO: 0 10E3/UL
NRBC BLD AUTO-RTO: 0 /100
PH UR STRIP: 7 [PH] (ref 5–9)
PLATELET # BLD AUTO: 257 10E3/UL (ref 150–450)
POTASSIUM BLD-SCNC: 3.8 MMOL/L (ref 3.5–5.1)
PROT SERPL-MCNC: 7.1 G/DL (ref 6.4–8.9)
RBC # BLD AUTO: 5.61 10E6/UL (ref 3.8–5.2)
RBC URINE: 1 /HPF
SODIUM SERPL-SCNC: 138 MMOL/L (ref 134–144)
SP GR UR STRIP: 1.02 (ref 1–1.03)
SQUAMOUS EPITHELIAL: 2 /HPF
UROBILINOGEN UR STRIP-MCNC: NORMAL MG/DL
WBC # BLD AUTO: 9 10E3/UL (ref 4–11)
WBC URINE: <1 /HPF

## 2022-04-19 PROCEDURE — 80053 COMPREHEN METABOLIC PANEL: CPT | Performed by: EMERGENCY MEDICINE

## 2022-04-19 PROCEDURE — 93010 ELECTROCARDIOGRAM REPORT: CPT | Performed by: INTERNAL MEDICINE

## 2022-04-19 PROCEDURE — 250N000013 HC RX MED GY IP 250 OP 250 PS 637: Performed by: EMERGENCY MEDICINE

## 2022-04-19 PROCEDURE — 93005 ELECTROCARDIOGRAM TRACING: CPT | Performed by: EMERGENCY MEDICINE

## 2022-04-19 PROCEDURE — 99283 EMERGENCY DEPT VISIT LOW MDM: CPT | Performed by: EMERGENCY MEDICINE

## 2022-04-19 PROCEDURE — 85025 COMPLETE CBC W/AUTO DIFF WBC: CPT | Performed by: EMERGENCY MEDICINE

## 2022-04-19 PROCEDURE — 36415 COLL VENOUS BLD VENIPUNCTURE: CPT | Performed by: EMERGENCY MEDICINE

## 2022-04-19 PROCEDURE — 81003 URINALYSIS AUTO W/O SCOPE: CPT | Performed by: EMERGENCY MEDICINE

## 2022-04-19 PROCEDURE — 99284 EMERGENCY DEPT VISIT MOD MDM: CPT | Performed by: EMERGENCY MEDICINE

## 2022-04-19 RX ORDER — MULTIPLE VITAMINS W/ MINERALS TAB 9MG-400MCG
1 TAB ORAL DAILY
COMMUNITY

## 2022-04-19 RX ORDER — CLONIDINE HYDROCHLORIDE 0.1 MG/1
0.1 TABLET ORAL ONCE
Status: COMPLETED | OUTPATIENT
Start: 2022-04-19 | End: 2022-04-19

## 2022-04-19 RX ORDER — COVID-19 ANTIGEN TEST
220 KIT MISCELLANEOUS 2 TIMES DAILY WITH MEALS
COMMUNITY

## 2022-04-19 RX ADMIN — CLONIDINE HYDROCHLORIDE 0.1 MG: 0.1 TABLET ORAL at 08:45

## 2022-04-19 ASSESSMENT — ENCOUNTER SYMPTOMS
DIARRHEA: 0
BLOOD IN STOOL: 1
NAUSEA: 1
VOMITING: 1
FEVER: 0
CHILLS: 0
LIGHT-HEADEDNESS: 1
AGITATION: 0
CHEST TIGHTNESS: 0
DYSURIA: 0
ABDOMINAL PAIN: 0
CONSTIPATION: 0
ARTHRALGIAS: 0
SHORTNESS OF BREATH: 0

## 2022-04-19 NOTE — ED TRIAGE NOTES
"ED Nursing Triage Note (General)   ________________________________    Gala Mendez is a 46 year old Female that presents to triage via private vehicle with her  for complaints of   Patient states last night she had a BM she noted bright red blood in her stool.  Patient states she then woke up this morning feeling nauseated and had an episode of hematemesis.  Patient states only 1 episode of emesis at this time.  Hematemesis was noted to be a darker red in color. Patient notes R) sided abdominal pain which was relieved slightly with changes in positioning.  On arrival patients states \"I feel more dizzy than pain, I just feel off\".  Hx of vertigo, patient has a script for meclizine, however, has not taken any. No noted hx of ulcers or varices, no recent medication changes.   Significant symptoms had onset 12 hours ago.  Patient appears alert behavior.  GCS-15  Airway: intact  Breathing noted as Normal  Action taken: 3      PRE HOSPITAL PRIOR LIVING SITUATION-home  "

## 2022-04-19 NOTE — ED PROVIDER NOTES
History     Chief Complaint   Patient presents with     Hematemesis     GI Problem     HPI  Gala Mendez is a 46 year old female who comes in concerned for having had some blood in her stool yesterday and threw up some blood this morning.  She said yesterday she had a bowel movement which felt completely normal.  It was not diarrhea she is not constipated.  She noticed however that there was quite a bit of bright red blood in the water afterwards.  She was able to see to the bottom of the toilet bowl but it definitely was a bloody looking water.  There is no pain.  She did not feel any bumps or anything suggestive of a hemorrhoid.  Since that time she has not had any further bowel movements or bleeding or leakage.  This morning she just has been feeling a little bit off and dizzy.  She does have a history of vertigo.  She had some episodes of emesis which she describes as dark brown and bloody.  Has not been feeling ill otherwise lately.  She has been taking Aleve daily for some time.      Allergies:  No Known Allergies    Problem List:    Patient Active Problem List    Diagnosis Date Noted     Family history of diabetes mellitus 01/18/2018     Priority: Medium     Family history of pancreatic cancer 01/18/2018     Priority: Medium     FHx: breast cancer 10/19/2016     Priority: Medium     Cervical dysplasia, mild 01/31/2012     Priority: Medium     Tobacco abuse 01/31/2012     Priority: Medium     Backache 12/12/2011     Priority: Medium     Cervical high risk human papillomavirus (HPV) DNA test positive 08/30/2011     Priority: Medium     Papanicolaou smear of cervix with atypical squamous cells of undetermined significance (ASC-US) 08/30/2011     Priority: Medium     Overview:   History of LSIL       Allergic rhinitis 08/16/2011     Priority: Medium     Encounter for surveillance of injectable contraceptive 06/29/2010     Priority: Medium        Past Medical History:    Past Medical History:   Diagnosis  Date     Personal history of other medical treatment (CODE)      Personal history of other medical treatment (CODE)        Past Surgical History:    Past Surgical History:   Procedure Laterality Date     OTHER SURGICAL HISTORY      2008,[facility]700,COLPOSCOPY,negative, abnormal pap     TONSILLECTOMY      No Comments Provided       Family History:    Family History   Problem Relation Age of Onset     Diabetes Father         Diabetes     Diabetes Other         Diabetes,family hx     Pancreatic Cancer Other         Cancer-pancreatic,family hx     Breast Cancer Mother         Cancer-breast     Breast Cancer Sister         Cancer-breast     Breast Cancer Maternal Aunt         Cancer-breast       Social History:  Marital Status:   [4]  Social History     Tobacco Use     Smoking status: Current Every Day Smoker     Packs/day: 0.75     Years: 18.00     Pack years: 13.50     Types: Cigarettes     Smokeless tobacco: Never Used   Substance Use Topics     Alcohol use: Yes     Alcohol/week: 1.0 standard drink     Drug use: Never        Medications:    meclizine (ANTIVERT) 12.5 MG tablet  multivitamin w/minerals (THERA-VIT-M) tablet  naproxen sodium 220 MG capsule          Review of Systems   Constitutional: Negative for chills and fever.   HENT: Negative for congestion.    Eyes: Negative for visual disturbance.   Respiratory: Negative for chest tightness and shortness of breath.    Cardiovascular: Negative for chest pain.   Gastrointestinal: Positive for blood in stool, nausea and vomiting. Negative for abdominal pain, constipation and diarrhea.   Genitourinary: Negative for dysuria.   Musculoskeletal: Negative for arthralgias.   Skin: Negative for rash.   Neurological: Positive for light-headedness.   Psychiatric/Behavioral: Negative for agitation.       Physical Exam   BP: (!) 189/109  Pulse: 112  Temp: 98.1  F (36.7  C)  Resp: 18  Weight: 110.2 kg (243 lb)  SpO2: 96 %      Physical Exam  Vitals and nursing note reviewed.  Exam conducted with a chaperone present.   Constitutional:       Appearance: Normal appearance.   HENT:      Head: Normocephalic and atraumatic.      Mouth/Throat:      Mouth: Mucous membranes are moist.   Eyes:      Conjunctiva/sclera: Conjunctivae normal.   Cardiovascular:      Rate and Rhythm: Normal rate and regular rhythm.      Heart sounds: Normal heart sounds.   Pulmonary:      Effort: Pulmonary effort is normal.      Breath sounds: Normal breath sounds.   Abdominal:      General: Abdomen is flat. Bowel sounds are normal. There is no distension.      Palpations: Abdomen is soft.      Tenderness: There is no abdominal tenderness.   Genitourinary:     Rectum: Normal.      Comments: No obvious external hemorrhoids.  Rectal tone is normal.  Do not palpate any abnormality such as internal hemorrhoids.  There is minimal discomfort, no obvious fissure.  Small amount of light brown stool and no obvious blood.  Skin:     General: Skin is warm and dry.   Neurological:      Mental Status: She is alert and oriented to person, place, and time.   Psychiatric:         Behavior: Behavior normal.         ED Course                 Procedures                Results for orders placed or performed during the hospital encounter of 04/19/22 (from the past 24 hour(s))   CBC with platelets differential    Narrative    The following orders were created for panel order CBC with platelets differential.  Procedure                               Abnormality         Status                     ---------                               -----------         ------                     CBC with platelets and d...[[id number]]  Abnormal            Final result                 Please view results for these tests on the individual orders.   Comprehensive metabolic panel   Result Value Ref Range    Sodium 138 134 - 144 mmol/L    Potassium 3.8 3.5 - 5.1 mmol/L    Chloride 103 98 - 107 mmol/L    Carbon Dioxide (CO2) 26 21 - 31 mmol/L    Anion Gap 9 3 - 14  mmol/L    Urea Nitrogen 11 7 - 25 mg/dL    Creatinine 0.73 0.60 - 1.20 mg/dL    Calcium 9.9 8.6 - 10.3 mg/dL    Glucose 118 (H) 70 - 105 mg/dL    Alkaline Phosphatase 70 34 - 104 U/L    AST 16 13 - 39 U/L    ALT 12 7 - 52 U/L    Protein Total 7.1 6.4 - 8.9 g/dL    Albumin 4.4 3.5 - 5.7 g/dL    Bilirubin Total 0.8 0.3 - 1.0 mg/dL    GFR Estimate >90 >60 mL/min/1.73m2   CBC with platelets and differential   Result Value Ref Range    WBC Count 9.0 4.0 - 11.0 10e3/uL    RBC Count 5.61 (H) 3.80 - 5.20 10e6/uL    Hemoglobin 17.4 (H) 11.7 - 15.7 g/dL    Hematocrit 49.7 (H) 35.0 - 47.0 %    MCV 89 78 - 100 fL    MCH 31.0 26.5 - 33.0 pg    MCHC 35.0 31.5 - 36.5 g/dL    RDW 11.9 10.0 - 15.0 %    Platelet Count 257 150 - 450 10e3/uL    % Neutrophils 76 %    % Lymphocytes 18 %    % Monocytes 4 %    % Eosinophils 1 %    % Basophils 1 %    % Immature Granulocytes 0 %    NRBCs per 100 WBC 0 <1 /100    Absolute Neutrophils 6.8 1.6 - 8.3 10e3/uL    Absolute Lymphocytes 1.7 0.8 - 5.3 10e3/uL    Absolute Monocytes 0.4 0.0 - 1.3 10e3/uL    Absolute Eosinophils 0.1 0.0 - 0.7 10e3/uL    Absolute Basophils 0.1 0.0 - 0.2 10e3/uL    Absolute Immature Granulocytes 0.0 <=0.4 10e3/uL    Absolute NRBCs 0.0 10e3/uL   UA with Microscopic reflex to Culture    Specimen: Urine, Clean Catch   Result Value Ref Range    Color Urine Light Yellow Colorless, Straw, Light Yellow, Yellow    Appearance Urine Clear Clear    Glucose Urine Negative Negative mg/dL    Bilirubin Urine Negative Negative    Ketones Urine Negative Negative mg/dL    Specific Gravity Urine 1.017 1.000 - 1.030    Blood Urine Negative Negative    pH Urine 7.0 5.0 - 9.0    Protein Albumin Urine Negative Negative mg/dL    Urobilinogen Urine Normal Normal, 2.0 mg/dL    Nitrite Urine Negative Negative    Leukocyte Esterase Urine Negative Negative    Mucus Urine Present (A) None Seen /LPF    RBC Urine 1 <=2 /HPF    WBC Urine <1 <=5 /HPF    Squamous Epithelials Urine 2 (H) <=1 /HPF     Narrative    Urine Culture not indicated       Medications   cloNIDine (CATAPRES) tablet 0.1 mg (has no administration in time range)       Assessments & Plan (with Medical Decision Making)     I have reviewed the nursing notes.    I have reviewed the findings, diagnosis, plan and need for follow up with the patient.  Patient had some bright red blood per rectum, most likely internal hemorrhoid.  She had a one-time episode last night and nothing since.  No obvious blood on rectal exam.  We discussed that this is most likely hemorrhoid and as long as it stops she would not need to come back in.  Did recommend following up to discuss with her primary care provider as well.  As far as her reported hematemesis.  She has been taking Aleve daily for some time.  I will have her start some Pepcid.  She is also quite hypertensive, she does state that she is anxious about being here.  We did recheck blood pressure on the opposite arm with a different cuff and the systolic is much improved at 153, however diastolic is still 106.  I am going to give her a one-time dose of clonidine to bring it down.  I recommended that she try to monitor her blood pressure.  I recommend following up with her primary provider to recheck this as well.    New Prescriptions    No medications on file       Final diagnoses:   BRBPR (bright red blood per rectum)   Hypertension, unspecified type   Hematemesis, presence of nausea not specified       4/19/2022   [facility] AND [facility]     Sridhar Sesay MD  04/19/22 [id number]

## 2022-04-20 LAB
ATRIAL RATE - MUSE: 87 BPM
DIASTOLIC BLOOD PRESSURE - MUSE: NORMAL MMHG
INTERPRETATION ECG - MUSE: NORMAL
P AXIS - MUSE: 55 DEGREES
PR INTERVAL - MUSE: 154 MS
QRS DURATION - MUSE: 86 MS
QT - MUSE: 372 MS
QTC - MUSE: 447 MS
R AXIS - MUSE: 20 DEGREES
SYSTOLIC BLOOD PRESSURE - MUSE: NORMAL MMHG
T AXIS - MUSE: 79 DEGREES
VENTRICULAR RATE- MUSE: 87 BPM

## 2022-05-14 ENCOUNTER — HEALTH MAINTENANCE LETTER (OUTPATIENT)
Age: 47
End: 2022-05-14

## 2022-05-18 ENCOUNTER — ALLIED HEALTH/NURSE VISIT (OUTPATIENT)
Dept: FAMILY MEDICINE | Facility: OTHER | Age: 47
End: 2022-05-18
Attending: FAMILY MEDICINE
Payer: COMMERCIAL

## 2022-05-18 VITALS — WEIGHT: 244.8 LBS | BODY MASS INDEX: 37.22 KG/M2

## 2022-05-18 DIAGNOSIS — Z30.42 ENCOUNTER FOR DEPO-PROVERA CONTRACEPTION: Primary | ICD-10-CM

## 2022-05-18 PROCEDURE — 96372 THER/PROPH/DIAG INJ SC/IM: CPT | Performed by: FAMILY MEDICINE

## 2022-05-18 PROCEDURE — 250N000011 HC RX IP 250 OP 636: Performed by: FAMILY MEDICINE

## 2022-05-18 RX ADMIN — MEDROXYPROGESTERONE ACETATE 150 MG: 150 INJECTION, SUSPENSION INTRAMUSCULAR at 10:56

## 2022-05-18 NOTE — PROGRESS NOTES
Patient requests ongoing injections of Depo-Provera      Verified patient's first and last name, and . Patient stated reason for visit today is to receive a Depo Provera injection. Patient denied any concerns with previous injections.       LAST PAP/EXAM:   Lab Results   Component Value Date    PAP NIL 2019     URINE HCG:not indicated    The following medication was given:   MEDICATION: Depo Provera 150mg  ROUTE: IM  SITE: Arm - Left    Depo Provera injection prepared and administered IM as ordered. Administration of medication documented in MAR (see MAR for further information regarding dose, lot #, NDC #, expiration date). Patient encouraged to wait in lobby for 15 minutes post-injection and notify staff immediately of any reaction. Next Depo Provera injection in series due NEXT INJECTION DUE: 8/3/22 - 22 . Patient provided appointment reminder card.       Kathleen Giles RN ....................  2022   10:56 AM

## 2022-05-31 ENCOUNTER — OFFICE VISIT (OUTPATIENT)
Dept: FAMILY MEDICINE | Facility: OTHER | Age: 47
End: 2022-05-31
Attending: NURSE PRACTITIONER
Payer: COMMERCIAL

## 2022-05-31 VITALS
DIASTOLIC BLOOD PRESSURE: 114 MMHG | RESPIRATION RATE: 20 BRPM | HEART RATE: 96 BPM | HEIGHT: 68 IN | OXYGEN SATURATION: 96 % | BODY MASS INDEX: 36.83 KG/M2 | WEIGHT: 243 LBS | SYSTOLIC BLOOD PRESSURE: 162 MMHG

## 2022-05-31 DIAGNOSIS — I10 BENIGN ESSENTIAL HYPERTENSION: ICD-10-CM

## 2022-05-31 DIAGNOSIS — R73.9 ELEVATED BLOOD SUGAR: ICD-10-CM

## 2022-05-31 DIAGNOSIS — Z12.11 COLON CANCER SCREENING: ICD-10-CM

## 2022-05-31 DIAGNOSIS — Z00.00 ROUTINE HISTORY AND PHYSICAL EXAMINATION OF ADULT: Primary | ICD-10-CM

## 2022-05-31 DIAGNOSIS — Z13.220 LIPID SCREENING: ICD-10-CM

## 2022-05-31 DIAGNOSIS — Z13.1 SCREENING FOR DIABETES MELLITUS: ICD-10-CM

## 2022-05-31 LAB
ANION GAP SERPL CALCULATED.3IONS-SCNC: 8 MMOL/L (ref 3–14)
BUN SERPL-MCNC: 9 MG/DL (ref 7–25)
CALCIUM SERPL-MCNC: 9.4 MG/DL (ref 8.6–10.3)
CHLORIDE BLD-SCNC: 107 MMOL/L (ref 98–107)
CHOLEST SERPL-MCNC: 191 MG/DL
CO2 SERPL-SCNC: 25 MMOL/L (ref 21–31)
CREAT SERPL-MCNC: 0.8 MG/DL (ref 0.6–1.2)
FASTING STATUS PATIENT QL REPORTED: YES
GFR SERPL CREATININE-BSD FRML MDRD: >90 ML/MIN/1.73M2
GLUCOSE BLD-MCNC: 104 MG/DL (ref 70–105)
HBA1C MFR BLD: 5.5 % (ref 4–6.2)
HDLC SERPL-MCNC: 34 MG/DL (ref 23–92)
LDLC SERPL CALC-MCNC: 109 MG/DL
NONHDLC SERPL-MCNC: 157 MG/DL
POTASSIUM BLD-SCNC: 3.8 MMOL/L (ref 3.5–5.1)
SODIUM SERPL-SCNC: 140 MMOL/L (ref 134–144)
TRIGL SERPL-MCNC: 242 MG/DL

## 2022-05-31 PROCEDURE — 99212 OFFICE O/P EST SF 10 MIN: CPT | Mod: 25 | Performed by: NURSE PRACTITIONER

## 2022-05-31 PROCEDURE — 36415 COLL VENOUS BLD VENIPUNCTURE: CPT | Mod: ZL | Performed by: NURSE PRACTITIONER

## 2022-05-31 PROCEDURE — 99396 PREV VISIT EST AGE 40-64: CPT | Performed by: NURSE PRACTITIONER

## 2022-05-31 PROCEDURE — 83036 HEMOGLOBIN GLYCOSYLATED A1C: CPT | Mod: ZL | Performed by: NURSE PRACTITIONER

## 2022-05-31 PROCEDURE — 80061 LIPID PANEL: CPT | Mod: ZL | Performed by: NURSE PRACTITIONER

## 2022-05-31 PROCEDURE — 82374 ASSAY BLOOD CARBON DIOXIDE: CPT | Mod: ZL | Performed by: NURSE PRACTITIONER

## 2022-05-31 RX ORDER — LISINOPRIL 10 MG/1
10 TABLET ORAL DAILY
Qty: 90 TABLET | Refills: 0 | Status: SHIPPED | OUTPATIENT
Start: 2022-05-31 | End: 2022-06-20

## 2022-05-31 ASSESSMENT — ENCOUNTER SYMPTOMS
DIARRHEA: 0
DIZZINESS: 0
JOINT SWELLING: 0
SHORTNESS OF BREATH: 0
SORE THROAT: 0
BREAST MASS: 0
PARESTHESIAS: 0
HEMATOCHEZIA: 1
DYSURIA: 0
HEARTBURN: 0
NAUSEA: 0
CONSTIPATION: 0
FEVER: 0
ARTHRALGIAS: 1
EYE PAIN: 0
ABDOMINAL PAIN: 0
COUGH: 0
HEADACHES: 0
CHILLS: 0
FREQUENCY: 0
HEMATURIA: 0
NERVOUS/ANXIOUS: 1
PALPITATIONS: 0
MYALGIAS: 1

## 2022-05-31 ASSESSMENT — PAIN SCALES - GENERAL: PAINLEVEL: NO PAIN (0)

## 2022-05-31 NOTE — NURSING NOTE
Patient presents today for annual physical.    Medication Reconciliation Complete    Halina Fuentes LPN  5/31/2022 7:39 AM

## 2022-05-31 NOTE — PROGRESS NOTES
"  Assessment & Plan   Problem List Items Addressed This Visit    None     Visit Diagnoses     Routine history and physical examination of adult    -  Primary    Colon cancer screening        Relevant Orders    COLOGUARD(EXACT SCIENCES) (Completed)    Lipid screening        Relevant Orders    Lipid Panel (Completed)    Benign essential hypertension        Relevant Medications    lisinopril (ZESTRIL) 10 MG tablet    Other Relevant Orders    Basic Metabolic Panel (Completed)    Elevated blood sugar        Relevant Orders    Hemoglobin A1c (Completed)    Screening for diabetes mellitus        Relevant Orders    Hemoglobin A1c (Completed)      Hypertension, started on lisinopril 10 mg daily.  She will obtain a blood pressure cuff for home monitoring and follow-up in clinic in 2 to 3 weeks.  Basic metabolic panel was updated, recently when she is in the ER her labs showed elevated glucose so added a hemoglobin A1c today.  This was normal.  Discussed lifestyle modifications to help with hypertension including exercise, low-sodium diet and weight loss as well as smoking cessation.    Cologmary ordered today.    Follow-up annually and as needed.             Tobacco Cessation:   reports that she has been smoking cigarettes. She has a 13.50 pack-year smoking history. She has never used smokeless tobacco.  Tobacco Cessation Action Plan: Information offered: Patient not interested at this time    BMI:   Estimated body mass index is 36.95 kg/m  as calculated from the following:    Height as of this encounter: 1.727 m (5' 8\").    Weight as of this encounter: 110.2 kg (243 lb).   Weight management plan: Discussed healthy diet and exercise guidelines        No follow-ups on file.    [facility] AND [facility] is a 46 year old who presents for the following health issues     Healthy Habits:     Getting at least 3 servings of Calcium per day:  Yes    Bi-annual eye exam:  Yes    " Dental care twice a year:  NO    Sleep apnea or symptoms of sleep apnea:  None    Diet:  Regular (no restrictions)    Frequency of exercise:  2-3 days/week    Duration of exercise:  15-30 minutes    Taking medications regularly:  Yes    Medication side effects:  None    PHQ-2 Total Score: 0    Additional concerns today:  No     She comes in today for routine physical.  Her only concern today is hypertension.  She has had elevated blood pressures in previous clinic visits as well as outside of the clinic.  She does have a family history of hypertension.  Reports minimal salt intake, no shortness of breath, chest pains or edema.  Does report some shortness of breath when she is wearing her mask at work.  Reports a lot of stress at work including a long commute.  She works at the [employer].  She does smoke daily.  She was in the ER recently for rectal bleeding but reports she has not had any since.  She feels that this bleeding may have been related to vaginal bleeding.    She has not had a colonoscopy or Cologuard.  Denies any family history of colon cancer.  Last mammogram was in July 2021, no family history of breast cancer.  She is current on tetanus.  Opted not to get COVID-19 or influenza vaccine at this time.  Current on Pap smear.      Review of Systems   See above      Objective    There were no vitals taken for this visit.  There is no height or weight on file to calculate BMI.  Physical Exam   GENERAL: healthy, alert and no distress  EYES: Eyes grossly normal to inspection, PERRL and conjunctivae and sclerae normal  HENT: ear canals and TM's normal, nose and mouth without ulcers or lesions  NECK: no adenopathy, no asymmetry, masses, or scars and thyroid normal to palpation  RESP: lungs clear to auscultation - no rales, rhonchi or wheezes  BREAST: normal without masses, tenderness or nipple discharge and no palpable axillary masses or adenopathy  CV: regular rate and rhythm, normal S1 S2,  no S3 or S4, no murmur, click or rub, no peripheral edema and peripheral pulses strong  ABDOMEN: soft, nontender, no hepatosplenomegaly, no masses and bowel sounds normal  MS: no gross musculoskeletal defects noted, no edema  SKIN: no suspicious lesions or rashes  NEURO: Normal strength and tone, mentation intact and speech normal  PSYCH: mentation appears normal, affect normal/bright    Results for orders placed or performed in visit on 05/31/22 (from the past 24 hour(s))   Hemoglobin A1c   Result Value Ref Range    Hemoglobin A1C 5.5 4.0 - 6.2 %   Basic Metabolic Panel   Result Value Ref Range    Sodium 140 134 - 144 mmol/L    Potassium 3.8 3.5 - 5.1 mmol/L    Chloride 107 98 - 107 mmol/L    Carbon Dioxide (CO2) 25 21 - 31 mmol/L    Anion Gap 8 3 - 14 mmol/L    Urea Nitrogen 9 7 - 25 mg/dL    Creatinine 0.80 0.60 - 1.20 mg/dL    Calcium 9.4 8.6 - 10.3 mg/dL    Glucose 104 70 - 105 mg/dL    GFR Estimate >90 >60 mL/min/1.73m2   Lipid Panel   Result Value Ref Range    Cholesterol 191 <200 mg/dL    Triglycerides 242 (H) <150 mg/dL    Direct Measure HDL 34 23 - 92 mg/dL    LDL Cholesterol Calculated 109 (H) <=100 mg/dL    Non HDL Cholesterol 157 (H) <130 mg/dL    Patient Fasting > 8hrs? Yes     Narrative    Cholesterol  Desirable:  <200 mg/dL    Triglycerides  Normal:  Less than 150 mg/dL  Borderline High:  150-199 mg/dL  High:  200-499 mg/dL  Very High:  Greater than or equal to 500 mg/dL    Direct Measure HDL  Female:  Greater than or equal to 50 mg/dL   Male:  Greater than or equal to 40 mg/dL    LDL Cholesterol  Desirable:  <100mg/dL  Above Desirable:  100-129 mg/dL   Borderline High:  130-159 mg/dL   High:  160-189 mg/dL   Very High:  >= 190 mg/dL    Non HDL Cholesterol  Desirable:  130 mg/dL  Above Desirable:  130-159 mg/dL  Borderline High:  160-189 mg/dL  High:  190-219 mg/dL  Very High:  Greater than or equal to 220 mg/dL

## 2022-06-20 ENCOUNTER — OFFICE VISIT (OUTPATIENT)
Dept: FAMILY MEDICINE | Facility: OTHER | Age: 47
End: 2022-06-20
Attending: NURSE PRACTITIONER
Payer: COMMERCIAL

## 2022-06-20 VITALS
DIASTOLIC BLOOD PRESSURE: 82 MMHG | SYSTOLIC BLOOD PRESSURE: 138 MMHG | TEMPERATURE: 97.4 F | WEIGHT: 242 LBS | HEART RATE: 68 BPM | BODY MASS INDEX: 36.8 KG/M2 | RESPIRATION RATE: 20 BRPM

## 2022-06-20 DIAGNOSIS — I10 BENIGN ESSENTIAL HYPERTENSION: Primary | ICD-10-CM

## 2022-06-20 PROCEDURE — 99213 OFFICE O/P EST LOW 20 MIN: CPT | Performed by: NURSE PRACTITIONER

## 2022-06-20 RX ORDER — LISINOPRIL 20 MG/1
20 TABLET ORAL DAILY
Qty: 90 TABLET | Refills: 3 | Status: SHIPPED | OUTPATIENT
Start: 2022-06-20 | End: 2023-06-07

## 2022-06-20 ASSESSMENT — PAIN SCALES - GENERAL: PAINLEVEL: MILD PAIN (2)

## 2022-06-20 NOTE — PROGRESS NOTES
Assessment & Plan   Problem List Items Addressed This Visit    None     Visit Diagnoses     Benign essential hypertension    -  Primary    Relevant Medications    lisinopril (ZESTRIL) 20 MG tablet      /Hypertension, clinic blood pressure readings are stable although she has worked on some lifestyle changes prior to coming in today.  Home readings continue to be elevated.  This time we will have her increase her lisinopril to 20 mg daily.  She will continue to work on lifestyle changes.  She will monitor blood pressures at home.  If these are consistently greater than 140/90, she will follow-up to see me in 2 to 3 weeks, if these are coming down into her goal range, she will continue with medication and follow-up annually and as needed.      No follow-ups on file.    [facility]   Gala is a 46 year old, presenting for the following health issues:  Clinic Care Coordination - Follow-up      HPI     Hypertension Follow-up      Do you check your blood pressure regularly outside of the clinic? Yes     Are you following a low salt diet? No    Are your blood pressures ever more than 140 on the top number (systolic) OR more   than 90 on the bottom number (diastolic), for example 140/90? No    She comes in today for recheck of her blood pressure.  3 to 4 weeks ago she was seen for a routine physical, blood pressures were quite elevated.  She has had some elevated blood pressures in the past as well.  She was started on lisinopril 10 mg daily.  Home blood pressure readings have been between 150-160/.  These have been coming down and she is tolerating the medication well.  Blood pressure in clinic today is good although she states she did not have any coffee or cigarettes this morning before she came into the clinic.    Review of Systems   See above      Objective    /82 (BP Location: Left arm, Patient Position: Sitting, Cuff Size: Adult Large)    Pulse 68   Temp 97.4  F (36.3  C) (Tympanic)   Resp 20   Wt 109.8 kg (242 lb)   LMP 06/20/2020 (Approximate)   BMI 36.80 kg/m    Body mass index is 36.8 kg/m .  Physical Exam   GENERAL: healthy, alert and no distress  RESP: lungs clear to auscultation - no rales, rhonchi or wheezes  CV: regular rate and rhythm, normal S1 S2, no S3 or S4  PSYCH: mentation appears normal, affect normal/bright                    .  ..

## 2022-06-20 NOTE — NURSING NOTE
"Patient presents to the clinic for follow up.    FOOD SECURITY SCREENING QUESTIONS:    The next two questions are to help us understand your food security.  If you are feeling you need any assistance in this area, we have resources available to support you today.    Hunger Vital Signs:  Within the past 12 months we worried whether our food would run out before we got money to buy more. Never  Within the past 12 months the food we bought just didn't last and we didn't have money to get more. Never    Advance Care Directive on file? no  Advance Care Directive provided to patient? Yes.  Chief Complaint   Patient presents with     Clinic Care Coordination - Follow-up       Initial /82 (BP Location: Left arm, Patient Position: Sitting, Cuff Size: Adult Large)   Pulse 68   Temp 97.4  F (36.3  C) (Tympanic)   Resp 20   Wt 109.8 kg (242 lb)   LMP 06/20/2020 (Approximate)   BMI 36.80 kg/m   Estimated body mass index is 36.8 kg/m  as calculated from the following:    Height as of 5/31/22: 1.727 m (5' 8\").    Weight as of this encounter: 109.8 kg (242 lb).  Medication Reconciliation: complete        Laura Luo LPN       "

## 2022-07-05 ENCOUNTER — HOSPITAL ENCOUNTER (OUTPATIENT)
Dept: MAMMOGRAPHY | Facility: OTHER | Age: 47
Discharge: HOME | End: 2022-07-05
Attending: FAMILY MEDICINE | Admitting: FAMILY MEDICINE
Payer: COMMERCIAL

## 2022-07-05 DIAGNOSIS — Z12.31 VISIT FOR SCREENING MAMMOGRAM: ICD-10-CM

## 2022-07-05 PROCEDURE — 77067 SCR MAMMO BI INCL CAD: CPT

## 2022-07-15 ENCOUNTER — ALLIED HEALTH/NURSE VISIT (OUTPATIENT)
Dept: FAMILY MEDICINE | Facility: OTHER | Age: 47
End: 2022-07-15
Payer: COMMERCIAL

## 2022-07-15 DIAGNOSIS — Z20.822 SUSPECTED 2019 NOVEL CORONAVIRUS INFECTION: ICD-10-CM

## 2022-07-15 PROCEDURE — C9803 HOPD COVID-19 SPEC COLLECT: HCPCS

## 2022-07-15 PROCEDURE — U0003 INFECTIOUS AGENT DETECTION BY NUCLEIC ACID (DNA OR RNA); SEVERE ACUTE RESPIRATORY SYNDROME CORONAVIRUS 2 (SARS-COV-2) (CORONAVIRUS DISEASE [COVID-19]), AMPLIFIED PROBE TECHNIQUE, MAKING USE OF HIGH THROUGHPUT TECHNOLOGIES AS DESCRIBED BY CMS-2020-01-R: HCPCS | Mod: ZL

## 2022-07-17 LAB — SARS-COV-2 RNA RESP QL NAA+PROBE: POSITIVE

## 2022-07-19 ENCOUNTER — TELEPHONE (OUTPATIENT)
Dept: NURSING | Facility: CLINIC | Age: 47
End: 2022-07-19

## 2022-07-19 NOTE — TELEPHONE ENCOUNTER
Patient classified as COVID treatment eligible by Epic high risk algorithm:  Yes    Coronavirus (COVID-19) Notification    Reason for call  Notify of POSITIVE COVID-19 lab result, assess symptoms,  review [facility] recommendations    Lab Result   Lab test for 2019-nCoV rRt-PCR or SARS-COV-2 PCR  Oropharyngeal AND/OR nasopharyngeal swabs were POSITIVE for 2019-nCoV RNA [OR] SARS-COV-2 RNA (COVID-19) RNA     We have been unable to reach patient by phone at this time to notify of their Positive COVID-19 result.    Left voicemail message requesting a call back to [phone] [facility] for results.        A Positive COVID-19 letter will be sent via [employer] or the mail.    Kaley Alas

## 2022-08-10 ENCOUNTER — ALLIED HEALTH/NURSE VISIT (OUTPATIENT)
Dept: FAMILY MEDICINE | Facility: OTHER | Age: 47
End: 2022-08-10
Attending: FAMILY MEDICINE
Payer: COMMERCIAL

## 2022-08-10 DIAGNOSIS — Z30.42 ENCOUNTER FOR DEPO-PROVERA CONTRACEPTION: Primary | ICD-10-CM

## 2022-08-10 PROCEDURE — 96372 THER/PROPH/DIAG INJ SC/IM: CPT | Performed by: FAMILY MEDICINE

## 2022-08-10 PROCEDURE — 250N000011 HC RX IP 250 OP 636: Performed by: FAMILY MEDICINE

## 2022-08-10 RX ADMIN — MEDROXYPROGESTERONE ACETATE 150 MG: 150 INJECTION, SUSPENSION INTRAMUSCULAR at 07:48

## 2022-08-10 NOTE — PROGRESS NOTES
After verifying pt last name and date of birth, pt was given below injection    LAST PAP/EXAM:   Lab Results   Component Value Date    PAP NIL 06/03/2019     URINE HCG:not indicated    The following medication was given:     MEDICATION: Depo Provera 150mg  ROUTE: IM  SITE: Deltoid - Left  LOT #: [id number]  EXP:4/1/2024  NEXT INJECTION DUE: 10/26/22 - 11/9/22   Provider: SILVA Faulkner RN on 8/10/2022 at 7:49 AM

## 2022-09-04 ENCOUNTER — HEALTH MAINTENANCE LETTER (OUTPATIENT)
Age: 47
End: 2022-09-04

## 2022-10-28 ENCOUNTER — ALLIED HEALTH/NURSE VISIT (OUTPATIENT)
Dept: FAMILY MEDICINE | Facility: OTHER | Age: 47
End: 2022-10-28
Attending: FAMILY MEDICINE
Payer: COMMERCIAL

## 2022-10-28 DIAGNOSIS — Z30.42 ENCOUNTER FOR DEPO-PROVERA CONTRACEPTION: Primary | ICD-10-CM

## 2022-10-28 PROCEDURE — 250N000011 HC RX IP 250 OP 636: Performed by: FAMILY MEDICINE

## 2022-10-28 PROCEDURE — 96372 THER/PROPH/DIAG INJ SC/IM: CPT | Performed by: FAMILY MEDICINE

## 2022-10-28 RX ADMIN — MEDROXYPROGESTERONE ACETATE 150 MG: 150 INJECTION, SUSPENSION INTRAMUSCULAR at 08:13

## 2022-10-28 NOTE — PROGRESS NOTES
LAST PAP/EXAM:   Lab Results   Component Value Date    PAP NIL 06/03/2019     URINE HCG:not indicated    The following medication was given:     MEDICATION: Depo Provera 150mg  ROUTE: IM  SITE: Deltoid - Left  LOT #: [id number]  EXP:04/2024  NEXT INJECTION DUE: 1/13/23 - 1/27/23   Provider: SILVA Keen RN on 10/28/2022 at 8:14 AM

## 2023-01-13 DIAGNOSIS — Z30.42 ENCOUNTER FOR DEPO-PROVERA CONTRACEPTION: ICD-10-CM

## 2023-01-13 RX ORDER — MEDROXYPROGESTERONE ACETATE 150 MG/ML
150 INJECTION, SUSPENSION INTRAMUSCULAR
Status: ACTIVE | OUTPATIENT
Start: 2023-03-07

## 2023-01-13 NOTE — PROGRESS NOTES
Pt is coming into clinic on Monday for Depo injection. Pt needs updated orders for Depo. Medication T'ed up in chart. Please sign. Thank you    Routing to DOD per PCP out for the next 2 days. Thank you    Marguerite Keen RN on 1/13/2023 at 9:45 AM

## 2023-01-16 ENCOUNTER — ALLIED HEALTH/NURSE VISIT (OUTPATIENT)
Dept: FAMILY MEDICINE | Facility: OTHER | Age: 48
End: 2023-01-16
Attending: FAMILY MEDICINE
Payer: COMMERCIAL

## 2023-01-16 DIAGNOSIS — Z30.42 ENCOUNTER FOR DEPO-PROVERA CONTRACEPTION: Primary | ICD-10-CM

## 2023-01-16 PROCEDURE — 96372 THER/PROPH/DIAG INJ SC/IM: CPT | Performed by: FAMILY MEDICINE

## 2023-01-16 PROCEDURE — 250N000011 HC RX IP 250 OP 636: Performed by: FAMILY MEDICINE

## 2023-01-16 RX ADMIN — MEDROXYPROGESTERONE ACETATE 150 MG: 150 INJECTION, SUSPENSION INTRAMUSCULAR at 09:47

## 2023-01-16 NOTE — PROGRESS NOTES
LAST PAP/EXAM:   Lab Results   Component Value Date    PAP NIL 06/03/2019     URINE HCG:not indicated    The following medication was given:     MEDICATION: Depo Provera 150mg  ROUTE: IM  SITE: Deltoid - Right  LOT #: [id number]  EXP:05/2024  NEXT INJECTION DUE: 4/3/23 - 4/17/23   Provider: VINI ASCENCIO RN on 1/16/2023 at 9:48 AM

## 2023-04-04 ENCOUNTER — ALLIED HEALTH/NURSE VISIT (OUTPATIENT)
Dept: FAMILY MEDICINE | Facility: OTHER | Age: 48
End: 2023-04-04
Attending: FAMILY MEDICINE
Payer: COMMERCIAL

## 2023-04-04 VITALS — DIASTOLIC BLOOD PRESSURE: 100 MMHG | WEIGHT: 246.6 LBS | SYSTOLIC BLOOD PRESSURE: 148 MMHG | BODY MASS INDEX: 37.5 KG/M2

## 2023-04-04 DIAGNOSIS — Z30.42 ENCOUNTER FOR DEPO-PROVERA CONTRACEPTION: Primary | ICD-10-CM

## 2023-04-04 PROCEDURE — 96372 THER/PROPH/DIAG INJ SC/IM: CPT | Performed by: FAMILY MEDICINE

## 2023-04-04 PROCEDURE — 250N000011 HC RX IP 250 OP 636: Performed by: FAMILY MEDICINE

## 2023-04-04 RX ADMIN — MEDROXYPROGESTERONE ACETATE 150 MG: 150 INJECTION, SUSPENSION INTRAMUSCULAR at 13:22

## 2023-04-04 NOTE — PROGRESS NOTES
"Patient requests ongoing injections of Depo-Provera    Verified patient's first and last name, and . Patient stated reason for visit today is to receive a Depo Provera injection. Patient denied any concerns with previous injections.     BP (!) 148/100 (Patient Position: Sitting, Cuff Size: Adult Large)   Wt 111.9 kg (246 lb 9.6 oz)   BMI 37.50 kg/m       Patient's blood pressure elevated this visit. Patient does appear to be nervous. Patient states she is \"anxious\". Blood pressure reassessed after injection given: 162/103. Patient states she monitors her blood pressure at home from time to time. Per patient, blood pressures at home have been running 140s-150s systolic over high 80s diastolic. Patient denies dizziness, headaches, shortness of breath, changes in vision. This RN recommended patient get scheduled for her physical and monitor her blood pressure daily until then. Discussed patient's blood pressures with JUAN DAVID Beckford. Per Dena Mcgrath, patient should decrease caffeine intake as well, otherwise no other recommendations at this time. Discussed recommendations and S/sx of elevated blood pressure with patient. Patient verbalized understanding of instructions.     LAST PAP/EXAM:   Lab Results   Component Value Date    PAP NIL 2019     URINE HCG:not indicated    The following medication was given:   MEDICATION: Depo Provera 150mg  ROUTE: IM  SITE: Deltoid - Left    Depo Provera injection prepared and administered IM as ordered. Administration of medication documented in MAR (see MAR for further information regarding dose, lot #, NDC #, expiration date).  Next Depo Provera injection in series due NEXT INJECTION DUE: 23 - 23 . Patient provided appointment reminder card.       KALIN KING RN on 2023 at 1:50 PM      "

## 2023-06-07 ENCOUNTER — OFFICE VISIT (OUTPATIENT)
Dept: FAMILY MEDICINE | Facility: OTHER | Age: 48
End: 2023-06-07
Attending: FAMILY MEDICINE
Payer: COMMERCIAL

## 2023-06-07 DIAGNOSIS — R42 SPINNING SENSATION: ICD-10-CM

## 2023-06-07 DIAGNOSIS — I10 BENIGN ESSENTIAL HYPERTENSION: ICD-10-CM

## 2023-06-07 DIAGNOSIS — H81.10 BENIGN PAROXYSMAL POSITIONAL VERTIGO, UNSPECIFIED LATERALITY: ICD-10-CM

## 2023-06-07 DIAGNOSIS — R87.810 CERVICAL HIGH RISK HUMAN PAPILLOMAVIRUS (HPV) DNA TEST POSITIVE: ICD-10-CM

## 2023-06-07 DIAGNOSIS — Z12.11 COLON CANCER SCREENING: Primary | ICD-10-CM

## 2023-06-07 DIAGNOSIS — Z02.89 HEALTH EXAMINATION OF DEFINED SUBPOPULATION: ICD-10-CM

## 2023-06-07 LAB
ANION GAP SERPL CALCULATED.3IONS-SCNC: 11 MMOL/L (ref 7–15)
BUN SERPL-MCNC: 8.3 MG/DL (ref 6–20)
CALCIUM SERPL-MCNC: 9.1 MG/DL (ref 8.6–10)
CHLORIDE SERPL-SCNC: 103 MMOL/L (ref 98–107)
CREAT SERPL-MCNC: 0.73 MG/DL (ref 0.51–0.95)
DEPRECATED HCO3 PLAS-SCNC: 26 MMOL/L (ref 22–29)
GFR SERPL CREATININE-BSD FRML MDRD: >90 ML/MIN/1.73M2
GLUCOSE SERPL-MCNC: 102 MG/DL (ref 70–99)
POTASSIUM SERPL-SCNC: 4.2 MMOL/L (ref 3.4–5.3)
SODIUM SERPL-SCNC: 140 MMOL/L (ref 136–145)

## 2023-06-07 PROCEDURE — 87624 HPV HI-RISK TYP POOLED RSLT: CPT | Mod: ZL | Performed by: FAMILY MEDICINE

## 2023-06-07 PROCEDURE — 80048 BASIC METABOLIC PNL TOTAL CA: CPT | Mod: ZL | Performed by: FAMILY MEDICINE

## 2023-06-07 PROCEDURE — G0123 SCREEN CERV/VAG THIN LAYER: HCPCS | Performed by: FAMILY MEDICINE

## 2023-06-07 PROCEDURE — 99396 PREV VISIT EST AGE 40-64: CPT | Performed by: FAMILY MEDICINE

## 2023-06-07 PROCEDURE — 36415 COLL VENOUS BLD VENIPUNCTURE: CPT | Mod: ZL | Performed by: FAMILY MEDICINE

## 2023-06-07 RX ORDER — MECLIZINE HCL 12.5 MG 12.5 MG/1
12.5-25 TABLET ORAL 3 TIMES DAILY PRN
Qty: 30 TABLET | Refills: 3 | Status: SHIPPED | OUTPATIENT
Start: 2023-06-07

## 2023-06-07 RX ORDER — LISINOPRIL 40 MG/1
40 TABLET ORAL DAILY
Qty: 90 TABLET | Refills: 4 | Status: SHIPPED | OUTPATIENT
Start: 2023-06-07 | End: 2024-06-17

## 2023-06-07 RX ORDER — LISINOPRIL 20 MG/1
20 TABLET ORAL DAILY
Qty: 90 TABLET | Refills: 3 | Status: CANCELLED | OUTPATIENT
Start: 2023-06-07

## 2023-06-07 ASSESSMENT — ENCOUNTER SYMPTOMS
WEAKNESS: 0
PARESTHESIAS: 0
DIARRHEA: 0
FREQUENCY: 0
MYALGIAS: 1
HEARTBURN: 0
FEVER: 0
JOINT SWELLING: 0
SORE THROAT: 0
NERVOUS/ANXIOUS: 1
NAUSEA: 0
CONSTIPATION: 0
HEMATURIA: 0
SHORTNESS OF BREATH: 0
HEMATOCHEZIA: 0
EYE PAIN: 0
CHILLS: 0
ARTHRALGIAS: 1
PALPITATIONS: 1
HEADACHES: 0
ABDOMINAL PAIN: 0
DIZZINESS: 0
DYSURIA: 0
BREAST MASS: 0
COUGH: 0

## 2023-06-07 ASSESSMENT — PAIN SCALES - GENERAL: PAINLEVEL: MILD PAIN (2)

## 2023-06-07 NOTE — NURSING NOTE
Patient here for yearly physical, last eye exam June 2022 and last dental exam 2021. Concerns with blood pressure running high at home. Medication Reconciliation: complete.    Nayeli Shah LPN  6/7/2023 8:42 AM

## 2023-06-08 VITALS
RESPIRATION RATE: 20 BRPM | WEIGHT: 247 LBS | OXYGEN SATURATION: 100 % | TEMPERATURE: 98.4 F | HEART RATE: 89 BPM | SYSTOLIC BLOOD PRESSURE: 144 MMHG | BODY MASS INDEX: 37.56 KG/M2 | DIASTOLIC BLOOD PRESSURE: 98 MMHG

## 2023-06-08 NOTE — PROGRESS NOTES
SUBJECTIVE:   Gala Mendez is a 47 year old female who presents to clinic today for the following health issues: Physical    Patient arrives here for physical and Pap smear.  She does have a history of abnormal Paps in the past.  Is on a 3-year cycle.  Last Pap smear was normal.    Healthy Habits:     Getting at least 3 servings of Calcium per day:  Yes    Bi-annual eye exam:  Yes    Dental care twice a year:  NO    Sleep apnea or symptoms of sleep apnea:  None    Diet:  Regular (no restrictions)    Frequency of exercise:  1 day/week    Duration of exercise:  Less than 15 minutes    Taking medications regularly:  Yes    Medication side effects:  None    PHQ-2 Total Score: 0    Additional concerns today:  Yes        Patient Active Problem List    Diagnosis Date Noted     Family history of diabetes mellitus 2018     Priority: Medium     Family history of pancreatic cancer 2018     Priority: Medium     FHx: breast cancer 10/19/2016     Priority: Medium     Cervical dysplasia, mild 2012     Priority: Medium     Tobacco abuse 2012     Priority: Medium     Backache 2011     Priority: Medium     Cervical high risk human papillomavirus (HPV) DNA test positive 2011     Priority: Medium     Papanicolaou smear of cervix with atypical squamous cells of undetermined significance (ASC-US) 2011     Priority: Medium     Overview:   History of LSIL       Allergic rhinitis 2011     Priority: Medium     Encounter for surveillance of injectable contraceptive 2010     Priority: Medium     Past Medical History:   Diagnosis Date     Personal history of other medical treatment (CODE)     G2, P1-0-1-1,  x1, EAB x1     Personal history of other medical treatment (CODE)     2008,with negative colposcopy      Current Outpatient Medications   Medication Sig Dispense Refill     lisinopril (ZESTRIL) 40 MG tablet Take 1 tablet (40 mg) by mouth daily 90 tablet 4     meclizine  (ANTIVERT) 12.5 MG tablet Take 1-2 tablets (12.5-25 mg) by mouth 3 times daily as needed for dizziness 30 tablet 3     multivitamin w/minerals (THERA-VIT-M) tablet Take 1 tablet by mouth daily       naproxen sodium 220 MG capsule Take 220 mg by mouth 2 times daily (with meals)       No Known Allergies    Review of Systems     OBJECTIVE:     BP (!) 162/110   Pulse 89   Temp 98.4  F (36.9  C)   Resp 20   Wt 112 kg (247 lb)   SpO2 100%   BMI 37.56 kg/m    Body mass index is 37.56 kg/m .  Physical Exam  Constitutional:       Appearance: Normal appearance.   HENT:      Head: Normocephalic.      Mouth/Throat:      Mouth: Mucous membranes are moist.   Cardiovascular:      Rate and Rhythm: Normal rate and regular rhythm.   Pulmonary:      Effort: Pulmonary effort is normal.      Breath sounds: Normal breath sounds.   Neurological:      Mental Status: She is alert.   Psychiatric:         Mood and Affect: Mood normal.         Thought Content: Thought content normal.         Diagnostic Test Results:  Results for orders placed or performed in visit on 06/07/23   Basic Metabolic Panel     Status: Abnormal   Result Value Ref Range    Sodium 140 136 - 145 mmol/L    Potassium 4.2 3.4 - 5.3 mmol/L    Chloride 103 98 - 107 mmol/L    Carbon Dioxide (CO2) 26 22 - 29 mmol/L    Anion Gap 11 7 - 15 mmol/L    Urea Nitrogen 8.3 6.0 - 20.0 mg/dL    Creatinine 0.73 0.51 - 0.95 mg/dL    Calcium 9.1 8.6 - 10.0 mg/dL    Glucose 102 (H) 70 - 99 mg/dL    GFR Estimate >90 >60 mL/min/1.73m2       ASSESSMENT/PLAN:         (Z12.11) Colon cancer screening  (primary encounter diagnosis)  Comment: Discussed colonoscopy versus Cologuard.  Patient is wishes Cologuard  Plan: COLOGUARD([employer])            (I10) Benign essential hypertension  Comment: Currently readings are not satisfactory.  I discussed adding further medication and patient states that she believes that is typically under good control at home.  Recommended home blood pressure  checks.  If continues elevated follow-up here in clinic  Plan: lisinopril (ZESTRIL) 40 MG tablet, Basic         Metabolic Panel            (H81.10) Benign paroxysmal positional vertigo, unspecified laterality  Comment:   Plan: meclizine (ANTIVERT) 12.5 MG tablet            (R42) Spinning sensation  Comment:   Plan: meclizine (ANTIVERT) 12.5 MG tablet            (R87.810) Cervical high risk human papillomavirus (HPV) DNA test positive  Comment:   Plan: Await Pap smear    (Z02.89) Health examination of defined subpopulation  Comment: Satisfactory  Plan: MA Screening Digital Bilateral, Pap Screen with        HPV - recommended age 30 - 65 years, HPV High         Risk Types DNA Cervical                Gaurav Dyer MD  [facility] AND [facility]

## 2023-06-12 LAB
BKR LAB AP GYN ADEQUACY: NORMAL
BKR LAB AP GYN INTERPRETATION: NORMAL
BKR LAB AP HPV REFLEX: NORMAL
BKR LAB AP PREVIOUS ABNORMAL: NORMAL
PATH REPORT.COMMENTS IMP SPEC: NORMAL
PATH REPORT.COMMENTS IMP SPEC: NORMAL
PATH REPORT.RELEVANT HX SPEC: NORMAL

## 2023-06-14 LAB
HUMAN PAPILLOMA VIRUS 16 DNA: NEGATIVE
HUMAN PAPILLOMA VIRUS 18 DNA: NEGATIVE
HUMAN PAPILLOMA VIRUS FINAL DIAGNOSIS: NORMAL
HUMAN PAPILLOMA VIRUS OTHER HR: NEGATIVE

## 2023-06-21 ENCOUNTER — LAB (OUTPATIENT)
Dept: FAMILY MEDICINE | Facility: OTHER | Age: 48
End: 2023-06-21
Payer: COMMERCIAL

## 2023-06-21 DIAGNOSIS — Z12.11 COLON CANCER SCREENING: ICD-10-CM

## 2023-06-26 ENCOUNTER — ALLIED HEALTH/NURSE VISIT (OUTPATIENT)
Dept: FAMILY MEDICINE | Facility: OTHER | Age: 48
End: 2023-06-26
Attending: FAMILY MEDICINE
Payer: COMMERCIAL

## 2023-06-26 VITALS — DIASTOLIC BLOOD PRESSURE: 116 MMHG | SYSTOLIC BLOOD PRESSURE: 142 MMHG

## 2023-06-26 DIAGNOSIS — Z30.42 ENCOUNTER FOR DEPO-PROVERA CONTRACEPTION: Primary | ICD-10-CM

## 2023-06-26 PROCEDURE — 250N000011 HC RX IP 250 OP 636: Mod: JZ | Performed by: FAMILY MEDICINE

## 2023-06-26 PROCEDURE — 96372 THER/PROPH/DIAG INJ SC/IM: CPT | Performed by: FAMILY MEDICINE

## 2023-06-26 RX ADMIN — MEDROXYPROGESTERONE ACETATE 150 MG: 150 INJECTION, SUSPENSION INTRAMUSCULAR at 08:37

## 2023-06-26 NOTE — PROGRESS NOTES
Clinic Administered Medication Documentation    Depo Provera Documentation    Depo-Provera Standing Order inclusion/exclusion criteria reviewed.    Is this the initial or subsequent dose of Depo Provera? Subsequent dose - patient is within the acceptable window of time (11-15 weeks) for subsequent injection. Pregnancy test not indicated.    Patient meets: inclusion criteria     Is there an active order (written within the past 365 days, with administrations remaining, not ) in the chart? Yes.     Prior to injection, verified patient identity using patient's name and date of birth. Patient's blood pressure today is 150/118. Dr. Dyer notified. Received verbal order to administer depo and recheck BP. Per Dr. Dyer, patient is to follow up with him for her blood pressure. Medication was administered. Please see MAR and medication order for additional information. Patient's blood pressure reassessed: 142/116. Patient made a follow up appointment with PCP on .     Vial/Syringe: Single dose vial. Was entire vial of medication used? Yes    Patient instructed to remain in clinic for 15 minutes and report any adverse reaction to staff immediately but patient declined.  NEXT INJECTION DUE: 23 - 10/9/23    Verified that the patient has refills remaining in their prescription.    KALIN KING RN on 2023 at 8:54 AM

## 2023-07-06 ENCOUNTER — OFFICE VISIT (OUTPATIENT)
Dept: FAMILY MEDICINE | Facility: OTHER | Age: 48
End: 2023-07-06
Attending: FAMILY MEDICINE
Payer: COMMERCIAL

## 2023-07-06 VITALS
BODY MASS INDEX: 38.23 KG/M2 | SYSTOLIC BLOOD PRESSURE: 152 MMHG | HEART RATE: 91 BPM | TEMPERATURE: 98.9 F | WEIGHT: 251.4 LBS | OXYGEN SATURATION: 97 % | RESPIRATION RATE: 18 BRPM | DIASTOLIC BLOOD PRESSURE: 112 MMHG

## 2023-07-06 DIAGNOSIS — I10 BENIGN ESSENTIAL HYPERTENSION: Primary | ICD-10-CM

## 2023-07-06 PROCEDURE — 99213 OFFICE O/P EST LOW 20 MIN: CPT | Performed by: FAMILY MEDICINE

## 2023-07-06 RX ORDER — AMLODIPINE BESYLATE 5 MG/1
5 TABLET ORAL DAILY
Qty: 90 TABLET | Refills: 4 | Status: SHIPPED | OUTPATIENT
Start: 2023-07-06 | End: 2024-07-24

## 2023-07-06 ASSESSMENT — PAIN SCALES - GENERAL: PAINLEVEL: MILD PAIN (3)

## 2023-07-06 NOTE — NURSING NOTE
"Chief Complaint   Patient presents with     RECHECK     Blood pressure       Initial BP (!) 146/108   Pulse 91   Temp 98.9  F (37.2  C) (Tympanic)   Resp 18   Wt 114 kg (251 lb 6.4 oz)   SpO2 97%   BMI 38.23 kg/m   Estimated body mass index is 38.23 kg/m  as calculated from the following:    Height as of 5/31/22: 1.727 m (5' 8\").    Weight as of this encounter: 114 kg (251 lb 6.4 oz).  Medication Reconciliation: complete    FOOD SECURITY SCREENING QUESTIONS  Hunger Vital Signs:  Within the past 12 months we worried whether our food would run out before we got money to buy more. Never  Within the past 12 months the food we bought just didn't last and we didn't have money to get more. Never  Elisabeth Vuong LPN 7/6/2023 3:36 PM        "

## 2023-07-06 NOTE — PROGRESS NOTES
Assessment & Plan     Benign essential hypertension  Start  - amLODIPine (NORVASC) 5 MG tablet; Take 1 tablet (5 mg) by mouth daily    Follow-up in 1 month.  Advised her to do home blood pressure checks and bring in the readings.         Nicotine/Tobacco Cessation:  She reports that she has been smoking cigarettes. She has a 13.50 pack-year smoking history. She has never used smokeless tobacco.  Nicotine/Tobacco Cessation Plan:             No follow-ups on file.    Gaurav Dyer MD  [facility]   Gala is a 47 year old, presenting for the following health issues:  RECHECK (Blood pressure)        7/6/2023     3:31 PM   Additional Questions   Roomed by ESTELLE Barber   Accompanied by Self         7/6/2023     3:31 PM   Patient Reported Additional Medications   Patient reports taking the following new medications N/A     Patient arrives here for follow-up blood pressure.  She does occasional blood pressures at home and reports in the 140s over 90s.  States when she is here she gets nervous and her blood pressure was up.    History of Present Illness       Hypertension: She presents for follow up of hypertension.  She does not check blood pressure  regularly outside of the clinic. Outside blood pressures have been over 140/90. She follows a low salt diet.     She eats 2-3 servings of fruits and vegetables daily.She consumes 2 sweetened beverage(s) daily.She exercises with enough effort to increase her heart rate 9 or less minutes per day.  She exercises with enough effort to increase her heart rate 3 or less days per week.   She is taking medications regularly.               Review of Systems         Objective    BP (!) 146/108   Pulse 91   Temp 98.9  F (37.2  C) (Tympanic)   Resp 18   Wt 114 kg (251 lb 6.4 oz)   SpO2 97%   BMI 38.23 kg/m    Body mass index is 38.23 kg/m .  Physical Exam  Constitutional:       Appearance: She is normal weight.   Cardiovascular:      Rate  and Rhythm: Normal rate.   Neurological:      Mental Status: She is alert.   Psychiatric:         Mood and Affect: Mood normal.         Thought Content: Thought content normal.

## 2023-07-21 ENCOUNTER — HOSPITAL ENCOUNTER (OUTPATIENT)
Dept: MAMMOGRAPHY | Facility: OTHER | Age: 48
Discharge: HOME | End: 2023-07-21
Attending: FAMILY MEDICINE | Admitting: FAMILY MEDICINE
Payer: COMMERCIAL

## 2023-07-21 DIAGNOSIS — Z12.31 VISIT FOR SCREENING MAMMOGRAM: ICD-10-CM

## 2023-07-21 DIAGNOSIS — Z02.89 HEALTH EXAMINATION OF DEFINED SUBPOPULATION: ICD-10-CM

## 2023-07-21 PROCEDURE — 77067 SCR MAMMO BI INCL CAD: CPT

## 2023-08-01 ENCOUNTER — PATIENT OUTREACH (OUTPATIENT)
Dept: FAMILY MEDICINE | Facility: OTHER | Age: 48
End: 2023-08-01
Payer: COMMERCIAL

## 2023-08-01 NOTE — TELEPHONE ENCOUNTER
Patient Quality Outreach    Patient is due for the following:   Hypertension -  BP check  Physical Preventive Adult Physical    Next Steps:   Schedule a Adult Preventative    Type of outreach:    Sent letter.      Questions for provider review:    None           Amarilis Kinney

## 2023-08-01 NOTE — LETTER
[facility]  [address]  [place] [id number] [phone]       August 1, 2023    Gala Mendez  [address]   [place] [address]    Dear Gala,    We care about your health and have reviewed your health plan and are making recommendations based on this review, to optimize your health.    You are in particular need of attention regarding:  -High Blood Pressure  -Wellness (Physical) Visit     We are recommending that you:  -schedule a WELLNESS (Physical) APPOINTMENT with me.   I will check fasting labs the same day - nothing to eat except water and meds for 8-10 hours prior.    In addition, here is a list of due or overdue Health Maintenance reminders.    Health Maintenance Due   Topic Date Due    Discuss Advance Care Planning  Never done    COVID-19 Vaccine (1) Never done    Pneumococcal Vaccine (1 - PCV) Never done    Colorectal Cancer Screening  Never done       To address the above recommendations, we encourage you to contact us at [phone], via [employer]. They will assist you with finding the most convenient time and location.    Thank you for trusting [facility] and we appreciate the opportunity to serve you.  We look forward to supporting your healthcare needs in the future.    Healthy Regards,    Your [facility] Team

## 2023-09-22 ENCOUNTER — ALLIED HEALTH/NURSE VISIT (OUTPATIENT)
Dept: FAMILY MEDICINE | Facility: OTHER | Age: 48
End: 2023-09-22
Payer: COMMERCIAL

## 2023-09-22 DIAGNOSIS — Z30.42 ENCOUNTER FOR DEPO-PROVERA CONTRACEPTION: Primary | ICD-10-CM

## 2023-09-22 PROCEDURE — 250N000011 HC RX IP 250 OP 636: Mod: JZ | Performed by: FAMILY MEDICINE

## 2023-09-22 PROCEDURE — 96372 THER/PROPH/DIAG INJ SC/IM: CPT | Performed by: FAMILY MEDICINE

## 2023-09-22 RX ADMIN — MEDROXYPROGESTERONE ACETATE 150 MG: 150 INJECTION, SUSPENSION INTRAMUSCULAR at 08:22

## 2023-09-22 NOTE — PROGRESS NOTES
Clinic Administered Medication Documentation    Depo Provera Documentation    Depo-Provera Standing Order inclusion/exclusion criteria reviewed.     Is this the initial or subsequent dose of Depo Provera? Subsequent dose - patient is within the acceptable window of time (11-15 weeks) for subsequent injection. Pregnancy test not indicated.    Patient meets: inclusion criteria     Is there an active order (written within the past 365 days, with administrations remaining, not ) in the chart? Yes.     Prior to injection, verified patient identity using patient's name and date of birth. Medication was administered. Please see MAR and medication order for additional information.     Vial/Syringe: Single dose vial. Was entire vial of medication used? Yes    Patient instructed to remain in clinic for 15 minutes and report any adverse reaction to staff immediately but patient declined.  NEXT INJECTION DUE: 23 - 24    Verified that the patient has refills remaining in their prescription.    KALIN KING RN on 2023 at 8:44 AM

## 2023-12-20 ENCOUNTER — ALLIED HEALTH/NURSE VISIT (OUTPATIENT)
Dept: FAMILY MEDICINE | Facility: OTHER | Age: 48
End: 2023-12-20
Attending: FAMILY MEDICINE
Payer: COMMERCIAL

## 2023-12-20 DIAGNOSIS — Z30.42 ENCOUNTER FOR DEPO-PROVERA CONTRACEPTION: Primary | ICD-10-CM

## 2023-12-20 PROCEDURE — 250N000011 HC RX IP 250 OP 636: Mod: JZ | Performed by: FAMILY MEDICINE

## 2023-12-20 PROCEDURE — 96372 THER/PROPH/DIAG INJ SC/IM: CPT | Performed by: FAMILY MEDICINE

## 2023-12-20 RX ADMIN — MEDROXYPROGESTERONE ACETATE 150 MG: 150 INJECTION, SUSPENSION INTRAMUSCULAR at 15:14

## 2023-12-20 NOTE — PROGRESS NOTES
Clinic Administered Medication Documentation    Depo Provera Documentation    Depo-Provera Standing Order inclusion/exclusion criteria reviewed.     Is this the initial or subsequent dose of Depo Provera? Subsequent dose - patient is within the acceptable window of time (11-15 weeks) for subsequent injection. Pregnancy test not indicated.    Patient meets: inclusion criteria     Is there an active order (written within the past 365 days, with administrations remaining, not ) in the chart? Yes.     Prior to injection, verified patient identity using patient's name and date of birth. Medication was administered. Please see MAR and medication order for additional information.     Vial/Syringe: Single dose vial. Was entire vial of medication used? Yes    Patient instructed to remain in clinic for 15 minutes and report any adverse reaction to staff immediately but patient declined.  NEXT INJECTION DUE: 3/6/24 - 4/3/24    Verified that the patient has refills remaining in their prescription.    KALIN KING RN on 2023 at 3:15 PM

## 2024-01-27 ENCOUNTER — HOSPITAL ENCOUNTER (EMERGENCY)
Facility: OTHER | Age: 49
Discharge: HOME | End: 2024-01-27
Attending: PHYSICIAN ASSISTANT | Admitting: PHYSICIAN ASSISTANT
Payer: COMMERCIAL

## 2024-01-27 ENCOUNTER — APPOINTMENT (OUTPATIENT)
Dept: GENERAL RADIOLOGY | Facility: OTHER | Age: 49
End: 2024-01-27
Attending: STUDENT IN AN ORGANIZED HEALTH CARE EDUCATION/TRAINING PROGRAM
Payer: COMMERCIAL

## 2024-01-27 VITALS
HEIGHT: 68 IN | RESPIRATION RATE: 16 BRPM | DIASTOLIC BLOOD PRESSURE: 65 MMHG | OXYGEN SATURATION: 98 % | BODY MASS INDEX: 38.04 KG/M2 | HEART RATE: 80 BPM | WEIGHT: 251 LBS | SYSTOLIC BLOOD PRESSURE: 140 MMHG | TEMPERATURE: 98.2 F

## 2024-01-27 DIAGNOSIS — M79.671 RIGHT FOOT PAIN: ICD-10-CM

## 2024-01-27 PROCEDURE — 73630 X-RAY EXAM OF FOOT: CPT | Mod: TC,RT

## 2024-01-27 PROCEDURE — 73620 X-RAY EXAM OF FOOT: CPT | Mod: TC,RT

## 2024-01-27 PROCEDURE — 99283 EMERGENCY DEPT VISIT LOW MDM: CPT | Performed by: PHYSICIAN ASSISTANT

## 2024-01-27 NOTE — ED TRIAGE NOTES
Pt here by herself, pt reports that her right foot has been hurting since Sunday when she ambulated up some stairs it started to hurt, VSS, pt out into waiting room     Triage Assessment (Adult)       Row Name 01/27/24 1057          Triage Assessment    Airway WDL WDL        Respiratory WDL    Respiratory WDL WDL        Skin Circulation/Temperature WDL    Skin Circulation/Temperature WDL WDL        Cardiac WDL    Cardiac WDL WDL        Peripheral/Neurovascular WDL    Peripheral Neurovascular WDL WDL        Cognitive/Neuro/Behavioral WDL    Cognitive/Neuro/Behavioral WDL WDL

## 2024-01-27 NOTE — DISCHARGE INSTRUCTIONS
-Follow-up with podiatry or orthopedic foot and ankle  -Warm foot up before activity.  Elevate and ice after activity.  -Use ibuprofen 600 mg every 6 hours for pain and swelling  -No signs of infection today.  X-rays are negative for fracture.  No signs of blood clot.  -Return to the ER for any worsening of symptoms

## 2024-01-27 NOTE — ED PROVIDER NOTES
"      EMERGENCY DEPARTMENT ENCOUNTER      NAME: Gala Mendez  AGE: 48 year old female  YOB: 1975  MRN: [id number]  EVALUATION DATE & TIME: 1/27/2024 11:40 AM    PCP: Gaurav Dyer    ED PROVIDER: Eduardo Vuong PA-C       CHIEF COMPLAINT:  Chief Complaint   Patient presents with    Foot Pain       ED COURSE, MEDICAL DECISION MAKING, FINAL IMPRESSION AND PLAN:     Assessment / Plan:  1. Right foot pain        The patient was interviewed and examined.  HPI and physical exam as below.  Differential diagnosis and MDM Key Documentation Elements as below.  Vitals, triage note, and nursing notes were reviewed.  BP (!) 140/65   Pulse 80   Temp 98.2  F (36.8  C) (Tympanic)   Resp 16   Ht 1.727 m (5' 8\")   Wt 113.9 kg (251 lb)   SpO2 98%   BMI 38.16 kg/m      Differential includes but is not limited to foot fracture, foot sprain, foot sprain    X-rays negative.  No signs of obvious fracture.  No signs of infection.  No signs of Achilles tendon disruption.    Patient may follow-up with orthopedics or podiatry.  Ibuprofen and Tylenol as needed for pain.  Return to the ER for any worsening symptoms    Pertinent Labs & Imaging studies reviewed. (See chart for details)  Results for orders placed or performed during the hospital encounter of 01/27/24   XR Foot Right G/E 3 Views    Impression    IMPRESSION:   No acute findings.    THIS DOCUMENT HAS BEEN ELECTRONICALLY SIGNED BY KALIA PANG MD     No results found for: \"ABORH\"      Reassessments, Medications, Interventions, & Response to Treatments:  No worsening symptoms while here in the ER.    Medications given during today's ER visit:  Medications - No data to display    Consultations:  None    Decision Rules, Medical Calculators, and Risk Stratification Tools:  None    MDM Key Documentation Elements for Patient's Evaluation:  Differential diagnosis to include high risk considerations: As above  Escalation to admission/observation considered: " Admission/observation considered, but patient does not meet admission criteria  Discussions and management with other clinicians:    3a. Independent interpretation of testing performed by another health professional:  -No  3b. Discussion of management or test interpretation with another health professional: -No  Independent interpretation of tests:  Ordering and/or review of 1 test(s)  Discussion of test interpretations with radiology:  No  History obtained from source other than patient or assessment requiring an independent historian:  No  Review of non-ED/external records:  review of 1 records  Diagnostic tests considered but not ultimately performed/deferred:  -MR foot, may be done as outpatient  Prescription medications considered but not prescribed:  -Oxycodone  Chronic conditions affecting care:  -None  Care affected by social determinants of health:  -None    The patient's management involved:   - Imaging studies      A shared decision making model was used. Time was taken to answer all questions.  Patient and/or associated parties understood and were agreeable to treatment plan.  Plan and all results were discussed. Warning signs and close return precautions to return to the ED given. Copy of results given. Discharged in stable condition. Discharged with discharge instructions outlining plan for further care and follow up.      PPE worn during patient evaluation:  Mask: Yes  Eye Protection: No  Gown: No  Hair cover: No  Face Shield: No  Patient wearing a mask: No    New prescriptions started at today's ER visit  Discharge Medication List as of 1/27/2024 12:05 PM          =================================================================    HPI  Gala Mendez is a pleasant 48 year old female who complains of right foot pain since last Sunday.  Patient states that she was going up the stairs and she felt a pop as she was stepping up.  Denies any falls.  No prior injury or trauma.  Moderate pain.  Localized  to the midfoot.  No radiation.  Still able to ambulate.      REVIEW OF SYSTEMS   Review of Systems  As above, otherwise ROS is unremarkable.      PAST MEDICAL HISTORY:  Past Medical History:   Diagnosis Date    Personal history of other medical treatment (CODE)     G2, P1-0-1-1,  x1, EAB x1    Personal history of other medical treatment (CODE)     2008,with negative colposcopy       PAST SURGICAL HISTORY:  Past Surgical History:   Procedure Laterality Date    OTHER SURGICAL HISTORY      ,[id number],COLPOSCOPY,negative, abnormal pap    TONSILLECTOMY      No Comments Provided       CURRENT MEDICATIONS:    Current Outpatient Medications   Medication Instructions    amLODIPine (NORVASC) 5 mg, Oral, DAILY    lisinopril (ZESTRIL) 40 mg, Oral, DAILY    meclizine (ANTIVERT) 12.5-25 mg, Oral, 3 TIMES DAILY PRN    multivitamin w/minerals (THERA-VIT-M) tablet 1 tablet, Oral, DAILY    naproxen sodium 220 mg, Oral, 2 TIMES DAILY WITH MEALS       ALLERGIES:  No Known Allergies    FAMILY HISTORY:  Family History   Problem Relation Age of Onset    Breast Cancer Mother         Cancer-breast    Diabetes Mother     Cerebrovascular Disease Mother     Diabetes Father         Diabetes    Breast Cancer Sister         Cancer-breast    Brain Tumor Brother         benign    Hypertension Brother     Diabetes Maternal Grandmother     Breast Cancer Maternal Grandmother     Diabetes Maternal Grandfather     Diabetes Paternal Grandmother     Breast Cancer Paternal Grandmother     Diabetes Paternal Grandfather     Breast Cancer Maternal Aunt     Breast Cancer Maternal Aunt     Breast Cancer Maternal Aunt     Diabetes Other         Diabetes,family hx    Pancreatic Cancer Other         Cancer-pancreatic,family hx       SOCIAL HISTORY:   Social History     Socioeconomic History    Marital status:    Tobacco Use    Smoking status: Every Day     Packs/day: 0.75     Years: 18.00     Additional pack years: 0.00     Total pack years: 13.50  "    Types: Cigarettes    Smokeless tobacco: Never   Vaping Use    Vaping Use: Never used   Substance and Sexual Activity    Alcohol use: Yes     Alcohol/week: 1.0 standard drink of alcohol     Comment: 1 x per week    Drug use: Never    Sexual activity: Yes     Partners: Male     Birth control/protection: Injection   Social History Narrative    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      Physical Exam  Vitals and nursing note reviewed.   Constitutional:       General: Active.   HENT:      Nose: Nose normal.      Mouth/Throat:      Mouth: Mucous membranes are moist.      Pharynx: Oropharynx is clear.   Eyes:      Conjunctiva/sclera: Conjunctivae normal.   Musculoskeletal:      Comments: Evaluation the right foot today revealed tenderness over the right midfoot.  No erythema, warmth, swelling, or bruising.  Achilles tendon was intact.  No heel tenderness.  No base of fifth metatarsal tenderness.  No ATFL, CFL, or deltoid ligament tenderness.  NVI  Skin:     General: Skin is warm and dry.   Neurological:      General: No focal deficit present.      Mental Status: Alert and oriented for age.   Psychiatric:         Mood and Affect: Mood normal.      LABS & RADIOLOGY:  All pertinent labs reviewed and interpreted. Reviewed all pertinent imaging. Please see official radiology report.  Results for orders placed or performed during the hospital encounter of 01/27/24   XR Foot Right G/E 3 Views    Impression    IMPRESSION:   No acute findings.    THIS DOCUMENT HAS BEEN ELECTRONICALLY SIGNED BY KALIA PANG MD     XR Foot Right G/E 3 Views   Final Result   IMPRESSION:    No acute findings.      THIS DOCUMENT HAS BEEN ELECTRONICALLY SIGNED BY KALIA PANG MD                I, Reynold Vuong, " SILVIA, personally performed the services described in this documentation, and it is both accurate and complete.       Eduardo Vuong PA-C  02/02/24 1959

## 2024-03-11 ENCOUNTER — TRANSFERRED RECORDS (OUTPATIENT)
Dept: HEALTH INFORMATION MANAGEMENT | Facility: OTHER | Age: 49
End: 2024-03-11
Payer: COMMERCIAL

## 2024-03-14 ENCOUNTER — TELEPHONE (OUTPATIENT)
Dept: FAMILY MEDICINE | Facility: OTHER | Age: 49
End: 2024-03-14
Payer: COMMERCIAL

## 2024-03-14 DIAGNOSIS — Z30.42 ENCOUNTER FOR DEPO-PROVERA CONTRACEPTION: Primary | ICD-10-CM

## 2024-03-14 RX ORDER — MEDROXYPROGESTERONE ACETATE 150 MG/ML
150 INJECTION, SUSPENSION INTRAMUSCULAR
Status: ACTIVE | OUTPATIENT
Start: 2024-03-18

## 2024-03-18 ENCOUNTER — ALLIED HEALTH/NURSE VISIT (OUTPATIENT)
Dept: FAMILY MEDICINE | Facility: OTHER | Age: 49
End: 2024-03-18
Attending: FAMILY MEDICINE
Payer: COMMERCIAL

## 2024-03-18 DIAGNOSIS — Z30.42 ENCOUNTER FOR DEPO-PROVERA CONTRACEPTION: Primary | ICD-10-CM

## 2024-03-18 PROCEDURE — 250N000011 HC RX IP 250 OP 636: Mod: JZ | Performed by: FAMILY MEDICINE

## 2024-03-18 PROCEDURE — 96372 THER/PROPH/DIAG INJ SC/IM: CPT | Performed by: FAMILY MEDICINE

## 2024-03-18 RX ADMIN — MEDROXYPROGESTERONE ACETATE 150 MG: 150 INJECTION, SUSPENSION INTRAMUSCULAR at 13:33

## 2024-03-18 NOTE — PROGRESS NOTES
Clinic Administered Medication Documentation    Depo Provera Documentation    Depo-Provera Standing Order inclusion/exclusion criteria reviewed. }    Is this the initial or subsequent dose of Depo Provera? Subsequent dose - patient is within the acceptable window of time (11-15 weeks) for subsequent injection. Pregnancy test not indicated.    Patient meets: inclusion criteria     Is there an active order (written within the past 365 days, with administrations remaining, not ) in the chart? Yes.     Prior to injection, verified patient identity using patient's name and date of birth. Medication was administered. Please see MAR and medication order for additional information.     Vial/Syringe: Single dose vial. Was entire vial of medication used? Yes    NEXT INJECTION DUE: 6/3/24 - 24    KALIN KING RN on 3/18/2024 at 1:31 PM

## 2024-03-20 ENCOUNTER — TELEPHONE (OUTPATIENT)
Dept: FAMILY MEDICINE | Facility: OTHER | Age: 49
End: 2024-03-20
Payer: COMMERCIAL

## 2024-03-20 NOTE — TELEPHONE ENCOUNTER
Called and left a message for patient to return call to unit 1.  will have patient schedule an appointment in GYN to discuss how long she should keep DEPO injections going. Please assist patient to schedule GYN appt. Nayeli Shah LPN .......................3/20/2024  2:53 PM

## 2024-03-21 NOTE — TELEPHONE ENCOUNTER
Returned a call from 03.20.24 please call after 3:30     Thank You      Nancy Uriarte on 3/21/2024 at 10:20 AM

## 2024-03-25 DIAGNOSIS — Z30.42 ENCOUNTER FOR SURVEILLANCE OF INJECTABLE CONTRACEPTIVE: Primary | ICD-10-CM

## 2024-03-28 ENCOUNTER — TRANSFERRED RECORDS (OUTPATIENT)
Dept: HEALTH INFORMATION MANAGEMENT | Facility: OTHER | Age: 49
End: 2024-03-28
Payer: COMMERCIAL

## 2024-03-29 ENCOUNTER — OFFICE VISIT (OUTPATIENT)
Dept: OBGYN | Facility: OTHER | Age: 49
End: 2024-03-29
Attending: FAMILY MEDICINE
Payer: COMMERCIAL

## 2024-03-29 VITALS
OXYGEN SATURATION: 97 % | BODY MASS INDEX: 37.94 KG/M2 | DIASTOLIC BLOOD PRESSURE: 90 MMHG | HEART RATE: 93 BPM | WEIGHT: 249.5 LBS | SYSTOLIC BLOOD PRESSURE: 122 MMHG

## 2024-03-29 DIAGNOSIS — Z30.42 ENCOUNTER FOR SURVEILLANCE OF INJECTABLE CONTRACEPTIVE: ICD-10-CM

## 2024-03-29 DIAGNOSIS — Z30.09 BIRTH CONTROL COUNSELING: Primary | ICD-10-CM

## 2024-03-29 PROCEDURE — 99213 OFFICE O/P EST LOW 20 MIN: CPT

## 2024-03-29 RX ORDER — ACETAMINOPHEN AND CODEINE PHOSPHATE 120; 12 MG/5ML; MG/5ML
0.35 SOLUTION ORAL DAILY
Qty: 87 TABLET | Refills: 3 | Status: SHIPPED | OUTPATIENT
Start: 2024-03-29

## 2024-03-29 ASSESSMENT — PAIN SCALES - GENERAL: PAINLEVEL: NO PAIN (0)

## 2024-03-29 NOTE — PROGRESS NOTES
CC:Birth control consult   HPI:  Gala is a 48 year old female who presents to discuss contraception options. She has been on depo for about 15 years. With this she has noticed weight gain. She reports she would like an alternative option of birth control as she knows that depo is not good for her. She is considering hysterectomy in the future. She reports that when she did get her periods she had significant dysmenorrhea. She reports she is a smoker and has hypertension on agents for control    She denies:   Estrogen contraception Qs: Migraine with aura, VTE history, Breast, ovarian, or endometrial cancers or family history of these , Breastfeeding currently , and MI or Stroke     No LMP recorded. Patient has had an injection.  Pap Smears:Due   Mammograms:Due 2024    OB History    Para Term  AB Living   2 1 1 0 1 1   SAB IAB Ectopic Multiple Live Births   0 0 0 0 1      # Outcome Date GA Lbr Ananda/2nd Weight Sex Delivery Anes PTL Lv   2 Term 95    M Vag-Spont None  MACY   1 AB         FD     Past Medical History:   Diagnosis Date    Personal history of other medical treatment (CODE)     G2, P1-0-1-1,  x1, EAB x1    Personal history of other medical treatment (CODE)     2008,with negative colposcopy       Current Outpatient Medications   Medication    amLODIPine (NORVASC) 5 MG tablet    lisinopril (ZESTRIL) 40 MG tablet    meclizine (ANTIVERT) 12.5 MG tablet    multivitamin w/minerals (THERA-VIT-M) tablet    naproxen sodium 220 MG capsule    norethindrone (MICRONOR) 0.35 MG tablet     Current Facility-Administered Medications   Medication    medroxyPROGESTERone (DEPO-PROVERA) injection 150 mg    medroxyPROGESTERone (DEPO-PROVERA) injection 150 mg     No Known Allergies  BP (!) 122/90 (BP Location: Right arm, Patient Position: Sitting)   Pulse 93   Wt 113.2 kg (249 lb 8 oz)   SpO2 97%   BMI 37.94 kg/m      REVIEW OF SYSTEMS  As Per HPI, Otherwise negative.      Exam:  Constitutional: healthy, alert, and no distress  Psych: normal affect   Pulm: nonlabored, easily talks in full sentences         Lab: No results found for any visits on 03/29/24.    ASSESSMENT/PLAN :    In discussing her reliability for consistent treatment she notes that she is good at remembering to take a medication each day. She is done child bearing. We discussed that some options, including DepoProvera and the Nexplanon can have a longer lag-time to resuming ovulation after discontinuation. After discussion of each option with consideration of her risk factors including POPs, Nexplanon implant or one of the IUDs she has decided she would like  POPs until she can get in for hysterectomy consult. Side effects and warning signs reiterated and she voices understanding. Return in 1 year(s) for renewal.    1. Birth control counseling    2. Encounter for surveillance of injectable contraceptive        NOMI Musa CNP  2:09 PM 3/29/2024

## 2024-03-29 NOTE — NURSING NOTE
"Chief Complaint   Patient presents with    Contraception     Would like to consider hysterectomy, or ablation. Pt would like information regarding egg release. Pt would like to see if she has reached menopause.       Initial BP (!) 122/90 (BP Location: Right arm, Patient Position: Sitting)   Pulse 93   Wt 113.2 kg (249 lb 8 oz)   SpO2 97%   BMI 37.94 kg/m   Estimated body mass index is 37.94 kg/m  as calculated from the following:    Height as of 1/27/24: 1.727 m (5' 8\").    Weight as of this encounter: 113.2 kg (249 lb 8 oz).  Medication Reconciliation: complete    Marguerite Keen RN    "

## 2024-06-17 ENCOUNTER — MYC REFILL (OUTPATIENT)
Dept: FAMILY MEDICINE | Facility: OTHER | Age: 49
End: 2024-06-17
Payer: COMMERCIAL

## 2024-06-17 DIAGNOSIS — I10 BENIGN ESSENTIAL HYPERTENSION: ICD-10-CM

## 2024-06-19 RX ORDER — LISINOPRIL 40 MG/1
40 TABLET ORAL DAILY
Qty: 60 TABLET | Refills: 0 | Status: SHIPPED | OUTPATIENT
Start: 2024-06-19 | End: 2024-07-24

## 2024-06-19 NOTE — TELEPHONE ENCOUNTER
Gala Mendez   to [facility] (supporting Gaurav Dyer MD)         6/18/24 10:48 AM  I called and scheduled but can't be seen until 7/24/24.  I did not fill the last refill for the medication and it was until 6/8/24.  The other bottle of medication was for July.  Thought I had to wait longer for my physical.  I have two pills left and will go until 7/24/24 without this medication.   I was wondering about a refill until the appointment     Requested Prescriptions   Pending Prescriptions Disp Refills    lisinopril (ZESTRIL) 40 MG tablet 90 tablet 4     Sig: Take 1 tablet (40 mg) by mouth daily       ACE Inhibitors (Including Combos) Protocol Failed - 6/19/2024  8:39 AM        Failed - Blood pressure under 140/90 in past 12 months- Clinicial or Patient Reported     BP Readings from Last 3 Encounters:   03/29/24 (!) 122/90   01/27/24 (!) 140/65   07/06/23 (!) 152/112   No data recorded        Failed - Has GFR on file in past 12 months and most recent value is normal        Failed - Normal serum potassium on file in past 12 months     Recent Labs   Lab Test 06/07/23  0931   POTASSIUM 4.2        Last Prescription Date:   6/7/23  Last Fill Qty/Refills:         90, R-4    Last Office Visit:              7/6/23 (HTN discussed)   Future Office visit:             Next 5 appointments (look out 90 days)      Jul 24, 2024 8:00 AM  (Arrive by 7:45 AM)  Adult Preventative Visit with Gaurav Dyer MD  [facility] ([facility] ) [address]  [place] [address]  [phone]     Unable to complete prescription refill per RN Medication Refill Policy. Jayde Santa RN .............. 6/19/2024  8:40 AM

## 2024-06-21 NOTE — PROGRESS NOTES
Patient called requesting visit asap to get tdap immunization. // patient has no other needs or concerns that need addressed.    Patientrequests ongoing injections of Depo-Provera  Date of last DMPA:    Adverse reaction to last shot?  no  Heavy bleeding or more than 14 days bleeding sincelast shot?  no  Wants to continue contraception for another 3 months, knowing that fertility may not return for up to 18 months?  yes  Recent migraine headaches?  no  Breast problems since last shot?  no  Problems with excessive hair loss, acne or facial hair?  No   Verified name and date of birth . New injections instructed to wait 15 min post injection in the lobby and to report any symptoms of a reaction to nursing .    Clarissa Lanier ....................  3/18/2019   10:05 AM

## 2024-07-24 ENCOUNTER — OFFICE VISIT (OUTPATIENT)
Dept: FAMILY MEDICINE | Facility: OTHER | Age: 49
End: 2024-07-24
Attending: FAMILY MEDICINE
Payer: COMMERCIAL

## 2024-07-24 VITALS
OXYGEN SATURATION: 96 % | SYSTOLIC BLOOD PRESSURE: 126 MMHG | TEMPERATURE: 97.6 F | BODY MASS INDEX: 37.68 KG/M2 | RESPIRATION RATE: 20 BRPM | WEIGHT: 248.6 LBS | HEART RATE: 90 BPM | DIASTOLIC BLOOD PRESSURE: 90 MMHG | HEIGHT: 68 IN

## 2024-07-24 DIAGNOSIS — Z12.11 COLON CANCER SCREENING: ICD-10-CM

## 2024-07-24 DIAGNOSIS — Z00.00 ROUTINE GENERAL MEDICAL EXAMINATION AT A HEALTH CARE FACILITY: Primary | ICD-10-CM

## 2024-07-24 DIAGNOSIS — I10 BENIGN ESSENTIAL HYPERTENSION: ICD-10-CM

## 2024-07-24 LAB
ANION GAP SERPL CALCULATED.3IONS-SCNC: 10 MMOL/L (ref 7–15)
BUN SERPL-MCNC: 8.2 MG/DL (ref 6–20)
CALCIUM SERPL-MCNC: 9.9 MG/DL (ref 8.8–10.4)
CHLORIDE SERPL-SCNC: 104 MMOL/L (ref 98–107)
CREAT SERPL-MCNC: 0.77 MG/DL (ref 0.51–0.95)
EGFRCR SERPLBLD CKD-EPI 2021: >90 ML/MIN/1.73M2
GLUCOSE SERPL-MCNC: 109 MG/DL (ref 70–99)
HCO3 SERPL-SCNC: 27 MMOL/L (ref 22–29)
POTASSIUM SERPL-SCNC: 4.4 MMOL/L (ref 3.4–5.3)
SODIUM SERPL-SCNC: 141 MMOL/L (ref 135–145)

## 2024-07-24 PROCEDURE — 90471 IMMUNIZATION ADMIN: CPT | Performed by: FAMILY MEDICINE

## 2024-07-24 PROCEDURE — 90715 TDAP VACCINE 7 YRS/> IM: CPT | Performed by: FAMILY MEDICINE

## 2024-07-24 PROCEDURE — 36415 COLL VENOUS BLD VENIPUNCTURE: CPT | Mod: ZL | Performed by: FAMILY MEDICINE

## 2024-07-24 PROCEDURE — 99396 PREV VISIT EST AGE 40-64: CPT | Mod: 25 | Performed by: FAMILY MEDICINE

## 2024-07-24 PROCEDURE — 80048 BASIC METABOLIC PNL TOTAL CA: CPT | Mod: ZL | Performed by: FAMILY MEDICINE

## 2024-07-24 RX ORDER — AMLODIPINE BESYLATE 5 MG/1
5 TABLET ORAL DAILY
Qty: 90 TABLET | Refills: 4 | Status: CANCELLED | OUTPATIENT
Start: 2024-07-24

## 2024-07-24 RX ORDER — AMLODIPINE BESYLATE 10 MG/1
10 TABLET ORAL DAILY
Qty: 90 TABLET | Refills: 4 | Status: SHIPPED | OUTPATIENT
Start: 2024-07-24

## 2024-07-24 RX ORDER — LISINOPRIL 40 MG/1
40 TABLET ORAL DAILY
Qty: 90 TABLET | Refills: 4 | Status: SHIPPED | OUTPATIENT
Start: 2024-07-24

## 2024-07-24 SDOH — HEALTH STABILITY: PHYSICAL HEALTH: ON AVERAGE, HOW MANY MINUTES DO YOU ENGAGE IN EXERCISE AT THIS LEVEL?: 20 MIN

## 2024-07-24 SDOH — HEALTH STABILITY: PHYSICAL HEALTH: ON AVERAGE, HOW MANY DAYS PER WEEK DO YOU ENGAGE IN MODERATE TO STRENUOUS EXERCISE (LIKE A BRISK WALK)?: 5 DAYS

## 2024-07-24 ASSESSMENT — PAIN SCALES - GENERAL: PAINLEVEL: MILD PAIN (2)

## 2024-07-24 ASSESSMENT — SOCIAL DETERMINANTS OF HEALTH (SDOH): HOW OFTEN DO YOU GET TOGETHER WITH FRIENDS OR RELATIVES?: ONCE A WEEK

## 2024-07-24 NOTE — PROGRESS NOTES
"  SUBJECTIVE:   Gala Mendez is a 48 year old female who presents to clinic today for the following health issues: Physical    Patient arrives here for physical medication refill.  She does have a history of hypertension.  She is up-to-date on her Pap smear.  She is in need of a mammogram and colon screening.  After discussion she wishes to pursue Cologuard.  She is not having any symptoms of.              Review of Systems     OBJECTIVE:     BP (!) 126/90   Pulse 90   Temp 97.6  F (36.4  C)   Resp 20   Ht 1.727 m (5' 8\")   Wt 112.8 kg (248 lb 9.6 oz)   SpO2 96%   BMI 37.80 kg/m    Body mass index is 37.8 kg/m .  Physical Exam  Constitutional:       Appearance: Normal appearance.   HENT:      Head: Normocephalic and atraumatic.      Mouth/Throat:      Mouth: Mucous membranes are dry.   Cardiovascular:      Rate and Rhythm: Normal rate and regular rhythm.   Pulmonary:      Effort: Pulmonary effort is normal.      Breath sounds: Normal breath sounds.   Abdominal:      General: Abdomen is flat.   Neurological:      Mental Status: She is alert.   Psychiatric:         Mood and Affect: Mood normal.         Diagnostic Test Results:  Results for orders placed or performed in visit on 07/24/24 (from the past 24 hour(s))   Basic Metabolic Panel   Result Value Ref Range    Sodium 141 135 - 145 mmol/L    Potassium 4.4 3.4 - 5.3 mmol/L    Chloride 104 98 - 107 mmol/L    Carbon Dioxide (CO2) 27 22 - 29 mmol/L    Anion Gap 10 7 - 15 mmol/L    Urea Nitrogen 8.2 6.0 - 20.0 mg/dL    Creatinine 0.77 0.51 - 0.95 mg/dL    GFR Estimate >90 >60 mL/min/1.73m2    Calcium 9.9 8.8 - 10.4 mg/dL    Glucose 109 (H) 70 - 99 mg/dL       ASSESSMENT/PLAN:         (Z00.00) Routine general medical examination at a health care facility  (primary encounter diagnosis)  Comment: Mammogram scheduled  Plan: MA Screen Bilateral w/Adin            (I10) Benign essential hypertension  Comment: Blood pressure not under good control will increase her " Norvasc to 10  Plan: lisinopril (ZESTRIL) 40 MG tablet, amLODIPine         (NORVASC) 10 MG tablet, Basic Metabolic Panel            (Z12.11) Colon cancer screening  Comment: Cologuard scheduled  Plan: COLOGUARD([employer])              Gaurav Dyer MD  [facility]

## 2024-07-24 NOTE — NURSING NOTE
Patient here for annual physical. Last eye exam July 2024 and last dental exam 3 years prior. No concerns today.  Medication Reconciliation: complete.    Nayeli Shah LPN  7/24/2024 7:44 AM

## 2024-08-07 ENCOUNTER — ORDERS ONLY (AUTO-RELEASED) (OUTPATIENT)
Dept: FAMILY MEDICINE | Facility: OTHER | Age: 49
End: 2024-08-07
Payer: COMMERCIAL

## 2024-08-07 DIAGNOSIS — Z12.11 COLON CANCER SCREENING: ICD-10-CM

## 2024-12-17 ENCOUNTER — HOSPITAL ENCOUNTER (OUTPATIENT)
Dept: MAMMOGRAPHY | Facility: OTHER | Age: 49
Discharge: HOME | End: 2024-12-17
Attending: FAMILY MEDICINE
Payer: COMMERCIAL

## 2024-12-17 DIAGNOSIS — Z12.31 VISIT FOR SCREENING MAMMOGRAM: ICD-10-CM

## 2024-12-17 DIAGNOSIS — Z00.00 ROUTINE GENERAL MEDICAL EXAMINATION AT A HEALTH CARE FACILITY: ICD-10-CM

## 2024-12-17 PROCEDURE — 77067 SCR MAMMO BI INCL CAD: CPT

## 2024-12-17 PROCEDURE — 77063 BREAST TOMOSYNTHESIS BI: CPT

## 2025-02-20 ENCOUNTER — OFFICE VISIT (OUTPATIENT)
Dept: SURGERY | Facility: OTHER | Age: 50
End: 2025-02-20
Attending: SURGERY
Payer: COMMERCIAL

## 2025-02-20 VITALS
BODY MASS INDEX: 38.16 KG/M2 | WEIGHT: 251 LBS | RESPIRATION RATE: 18 BRPM | HEART RATE: 96 BPM | TEMPERATURE: 96.7 F | DIASTOLIC BLOOD PRESSURE: 88 MMHG | OXYGEN SATURATION: 96 % | SYSTOLIC BLOOD PRESSURE: 136 MMHG

## 2025-02-20 DIAGNOSIS — B37.2 YEAST INFECTION OF THE SKIN: ICD-10-CM

## 2025-02-20 DIAGNOSIS — Z80.3 FAMILY HISTORY OF MALIGNANT NEOPLASM OF BREAST: Primary | ICD-10-CM

## 2025-02-20 DIAGNOSIS — Z80.41 FAMILY HISTORY OF MALIGNANT NEOPLASM OF OVARY: ICD-10-CM

## 2025-02-20 RX ORDER — NYSTATIN 100000 [USP'U]/G
POWDER TOPICAL 2 TIMES DAILY
Qty: 60 G | Refills: 2 | Status: SHIPPED | OUTPATIENT
Start: 2025-02-20

## 2025-02-20 ASSESSMENT — PAIN SCALES - GENERAL: PAINLEVEL_OUTOF10: NO PAIN (0)

## 2025-02-20 NOTE — PATIENT INSTRUCTIONS
Use the yeast powder twice a day as needed for yeast infection under the breasts.   We will call you when the MyRisk test becomes available for genetic counseling.   You will be due for mammogram bilaterally in December 2025.   If you learn of any new family history of breast, ovarian or prostate cancer-please let us know!   If you have questions about breast changes or concerns-please let us know!!

## 2025-02-20 NOTE — PROGRESS NOTES
Primary Care Physician: Gaurav Dyer MD    A copy of this note will be sent to Gaurav Dyer MD    HPI:   The patient is a 49 year old female with a recent screening mammogram showing scattered areas of dense breast tissue. She hasn't noted any new lumps in her breasts or arm pits. She has not had breast pain. She has no nipple drainage bilaterally. She has not noted any new skin lesions on the breasts. She hasn't had a breast biopsy. She has family history of breast cancer: mother 60, one of 4 maternal aunts and maternal grandmother. Mother also had ovarian cancer. She has no personal history of breast cancer or other cancer.      CONSULTATION ASSESSMENT AND PLAN/RECOMMENDATIONS:   I discussed with the patient that family history can increase her lifetime risk for breast cancer. We discussed current NCCN guidelines for high risk screening and surveillance in patients with a greater than 20 % lifetime risk of breast cancer.  We discussed that her estimated lifetime breast cancer risk is estimated to be 18.1 % by the CHARLES model. This is elevated but not above the 20% threshold. It is recommended that she continue annual screening mammogram.  We discussed the option of a genetic evaluation for further information about her breast cancer risk and the risks she may have for other cancers. The patient expressed understanding and denies further questions at this time. She would like to pursue the genetic counseling referral. We will watch for this appointment and any testing results. If results will change her screening recommendations or risk, we will see her in the office to discuss. The patient will call with questions or concerns.   She will be due for mammogram in December 2025.    REVIEW OF SYSTEMS  GENERAL: No fevers or chills. Denies fatigue, recent weight loss.  HEENT: No sinus drainage. No changes with vision or hearing. No difficulty swallowing.   LYMPHATICS:  No swollen nodes in axilla, neck or  groin.  CARDIOVASCULAR: Denies chest pain, palpitations and dyspnea on exertion.  PULMONARY: No increased shortness of breath or cough. No increase in sputum production.  GI: Denies melena, bright red blood in stools. No hematemesis. No constipation or diarrhea.  : No dysuria or hematuria.  SKIN: No recent rashes or ulcers.   HEMATOLOGY:  No history of easy bruising or bleeding.  ENDOCRINE:  No history of diabetes or thyroid problems.  NEUROLOGY:  No history of seizures or headaches. No motor or sensory changes.  BREASTS: As above.  Past Medical History:   Diagnosis Date    Benign essential hypertension     Personal history of other medical treatment (CODE)     G2, P1-0-1-1,  x1, EAB x1    Personal history of other medical treatment (CODE)     2008,with negative colposcopy       Past Surgical History:   Procedure Laterality Date    OTHER SURGICAL HISTORY      ,[id number],COLPOSCOPY,negative, abnormal pap    TONSILLECTOMY      No Comments Provided       Current Outpatient Medications   Medication Sig Dispense Refill    amLODIPine (NORVASC) 10 MG tablet Take 1 tablet (10 mg) by mouth daily 90 tablet 4    lisinopril (ZESTRIL) 40 MG tablet Take 1 tablet (40 mg) by mouth daily 90 tablet 4    meclizine (ANTIVERT) 12.5 MG tablet Take 1-2 tablets (12.5-25 mg) by mouth 3 times daily as needed for dizziness 30 tablet 3    multivitamin w/minerals (THERA-VIT-M) tablet Take 1 tablet by mouth daily      naproxen sodium 220 MG capsule Take 220 mg by mouth 2 times daily (with meals)      norethindrone (MICRONOR) 0.35 MG tablet Take 1 tablet (0.35 mg) by mouth daily Do not take inactive pills. 87 tablet 3     No current facility-administered medications for this visit.       No Known Allergies    Family History   Problem Relation Age of Onset    Breast Cancer Mother 60        Cancer-breast    Diabetes Mother     Cerebrovascular Disease Mother     Ovarian Cancer Mother         early 40s    Diabetes Father         Diabetes     Brain Tumor Brother         benign    Hypertension Brother     Diabetes Maternal Grandmother     Breast Cancer Maternal Grandmother         60s    Diabetes Maternal Grandfather     Diabetes Paternal Grandfather     Pancreatic Cancer Paternal Grandfather     Breast Cancer Maternal Aunt     Cervical Cancer Maternal Aunt     Cervical Cancer Maternal Aunt     Cervical Cancer Maternal Aunt     Diabetes Other         Diabetes,family hx       Social History     Socioeconomic History    Marital status:      Spouse name: None    Number of children: None    Years of education: None    Highest education level: None   Tobacco Use    Smoking status: Every Day     Current packs/day: 0.75     Average packs/day: 0.8 packs/day for 34.1 years (25.6 ttl pk-yrs)     Types: Cigarettes     Start date: 1991    Smokeless tobacco: Never   Vaping Use    Vaping status: Never Used   Substance and Sexual Activity    Alcohol use: Yes     Alcohol/week: 1.0 standard drink of alcohol     Comment: 1 x per week    Drug use: Never    Sexual activity: Yes     Partners: Male     Birth control/protection: Pill   Social History Narrative    She works at a [place] [employer]    , 1 son, 2 step sons     Social Drivers of Health     Financial Resource Strain: Low Risk  (7/24/2024)    Financial Resource Strain     Within the past 12 months, have you or your family members you live with been unable to get utilities (heat, electricity) when it was really needed?: No   Food Insecurity: Low Risk  (7/24/2024)    Food Insecurity     Within the past 12 months, did you worry that your food would run out before you got money to buy more?: No     Within the past 12 months, did the food you bought just not last and you didn t have money to get more?: No   Transportation Needs: Low Risk  (7/24/2024)    Transportation Needs     Within the past 12 months, has lack of transportation kept you from medical appointments, getting your medicines,  non-medical meetings or appointments, work, or from getting things that you need?: No   Physical Activity: Insufficiently Active (7/24/2024)    Exercise Vital Sign     Days of Exercise per Week: 5 days     Minutes of Exercise per Session: 20 min   Stress: No Stress Concern Present (7/24/2024)    [employer] [place] [employer] - Occupational Stress Questionnaire     Feeling of Stress : Only a little   Social Connections: Unknown (7/24/2024)    Social Connection and Isolation Panel [NHANES]     Frequency of Social Gatherings with Friends and Family: Once a week   Interpersonal Safety: Low Risk  (2/20/2025)    Interpersonal Safety     Do you feel physically and emotionally safe where you currently live?: Yes     Within the past 12 months, have you been hit, slapped, kicked or otherwise physically hurt by someone?: No     Within the past 12 months, have you been humiliated or emotionally abused in other ways by your partner or ex-partner?: No   Housing Stability: Low Risk  (7/24/2024)    Housing Stability     Do you have housing? : Yes     Are you worried about losing your housing?: No       The above history was reviewed and updated today, 2/20/2025    PHYSICAL EXAM  /88 (BP Location: Right arm, Patient Position: Sitting, Cuff Size: Adult Large)   Pulse 96   Temp (!) 96.7  F (35.9  C) (Tympanic)   Resp 18   Wt 113.9 kg (251 lb)   LMP  (LMP Unknown)   SpO2 96%   BMI 38.16 kg/m     GENERAL: Healthy appearing patient in no acute distress. Pleasant and cooperative with exam and interview.   HEENT: Head-normocephalic. Eyes-no scleral icterus, pupils equal, round, and reactive to light. Nose-no nasal drainage. No lesions. Mouth-oral mucosapink and moist, no lesions.  NECK: Supple. No thyroid nodules. Trachea midline.  LYMPHATICS:  No cervical, axillary or supraclavicular adenopathy.  BREASTS: The breasts were examined bilaterally in the seated and supine position.  No asymmetry noted. Bilateral areas  of dense breast tissue noted on palpation. No nipple discharge or inversion noted. Yeast infection inframammary fold of right breast. No palpable masses noted. No tenderness on exam.  SKIN: Pink, warm and dry. No jaundice. No rash.  NEURO:  Cranial nerves II-XII grossly intact. Alert and oriented.  PSYCH: Appropriate mood and affect.     IMAGING/LAB  I personally reviewed patient's recent and previous mammogram images and reports.

## 2025-02-20 NOTE — NURSING NOTE
"Chief Complaint   Patient presents with    Consult     Risk eval       Initial /88 (BP Location: Right arm, Patient Position: Sitting, Cuff Size: Adult Large)   Pulse 96   Temp (!) 96.7  F (35.9  C) (Tympanic)   Resp 18   Wt 113.9 kg (251 lb)   LMP  (LMP Unknown)   SpO2 96%   BMI 38.16 kg/m   Estimated body mass index is 38.16 kg/m  as calculated from the following:    Height as of 12/13/24: 1.727 m (5' 8\").    Weight as of this encounter: 113.9 kg (251 lb).  Medication Reconciliation: complete    What age did your menstrual cycle start? 13  Are you on or have you ever taken any hormone replacement or birth control? Birth control  How many children do you have? 1  How old were you when your first child was born? 19  Did you breast feed? no  Do you have a family history of breast cancer? Mom - 60, maternal aunt and maternal grandma, unknown history on paternal side  Laura Zarate LPN..........2/20/2025  9:27 AM  "

## 2025-03-26 ENCOUNTER — MYC REFILL (OUTPATIENT)
Dept: OBGYN | Facility: OTHER | Age: 50
End: 2025-03-26
Payer: COMMERCIAL

## 2025-03-26 DIAGNOSIS — Z30.09 BIRTH CONTROL COUNSELING: ICD-10-CM

## 2025-03-26 RX ORDER — ACETAMINOPHEN AND CODEINE PHOSPHATE 120; 12 MG/5ML; MG/5ML
0.35 SOLUTION ORAL DAILY
Qty: 87 TABLET | Refills: 3 | Status: SHIPPED | OUTPATIENT
Start: 2025-03-26

## 2025-03-26 NOTE — TELEPHONE ENCOUNTER
Pt sent Rx request for the following:      Requested Prescriptions   Pending Prescriptions Disp Refills    norethindrone (MICRONOR) 0.35 MG tablet 87 tablet 3     Sig: Take 1 tablet (0.35 mg) by mouth daily. Do not take inactive pills.       Contraceptives Protocol Failed - 3/26/2025  9:22 AM        Failed - Patient is not a current smoker if age is 35 or older     Last Prescription Date:   03/29/2024  Last Fill Qty/Refills:         87, R-3  Last Office Visit:              12/13/2024   Future Office visit:           None     Mary Shah, RN on 3/26/2025 at 9:23 AM

## 2025-07-08 ENCOUNTER — OFFICE VISIT (OUTPATIENT)
Dept: FAMILY MEDICINE | Facility: OTHER | Age: 50
End: 2025-07-08
Attending: STUDENT IN AN ORGANIZED HEALTH CARE EDUCATION/TRAINING PROGRAM
Payer: COMMERCIAL

## 2025-07-08 ENCOUNTER — HOSPITAL ENCOUNTER (OUTPATIENT)
Dept: GENERAL RADIOLOGY | Facility: OTHER | Age: 50
Discharge: HOME | End: 2025-07-08
Attending: NURSE PRACTITIONER
Payer: COMMERCIAL

## 2025-07-08 VITALS
OXYGEN SATURATION: 93 % | DIASTOLIC BLOOD PRESSURE: 84 MMHG | TEMPERATURE: 98.2 F | RESPIRATION RATE: 16 BRPM | HEIGHT: 68 IN | HEART RATE: 92 BPM | SYSTOLIC BLOOD PRESSURE: 120 MMHG | BODY MASS INDEX: 37.28 KG/M2 | WEIGHT: 246 LBS

## 2025-07-08 DIAGNOSIS — Z87.891 HX OF SMOKING: ICD-10-CM

## 2025-07-08 DIAGNOSIS — R91.8 OPACITY OF LUNG ON IMAGING STUDY: ICD-10-CM

## 2025-07-08 DIAGNOSIS — R05.1 ACUTE COUGH: Primary | ICD-10-CM

## 2025-07-08 DIAGNOSIS — R06.2 WHEEZING: ICD-10-CM

## 2025-07-08 DIAGNOSIS — J18.9 PNEUMONIA OF LEFT UPPER LOBE DUE TO INFECTIOUS ORGANISM: ICD-10-CM

## 2025-07-08 PROCEDURE — 71046 X-RAY EXAM CHEST 2 VIEWS: CPT

## 2025-07-08 PROCEDURE — 71046 X-RAY EXAM CHEST 2 VIEWS: CPT | Mod: 26 | Performed by: RADIOLOGY

## 2025-07-08 RX ORDER — CEFDINIR 300 MG/1
300 CAPSULE ORAL 2 TIMES DAILY
Qty: 20 CAPSULE | Refills: 0 | Status: SHIPPED | OUTPATIENT
Start: 2025-07-08 | End: 2025-07-18

## 2025-07-08 RX ORDER — AZITHROMYCIN 250 MG/1
TABLET, FILM COATED ORAL
Qty: 6 TABLET | Refills: 0 | Status: SHIPPED | OUTPATIENT
Start: 2025-07-08 | End: 2025-07-13

## 2025-07-08 RX ORDER — PREDNISONE 20 MG/1
40 TABLET ORAL DAILY
Qty: 10 TABLET | Refills: 0 | Status: SHIPPED | OUTPATIENT
Start: 2025-07-08 | End: 2025-07-13

## 2025-07-08 RX ORDER — ALBUTEROL SULFATE 90 UG/1
2 INHALANT RESPIRATORY (INHALATION) EVERY 6 HOURS PRN
Qty: 18 G | Refills: 0 | Status: SHIPPED | OUTPATIENT
Start: 2025-07-08

## 2025-07-08 ASSESSMENT — ENCOUNTER SYMPTOMS
FATIGUE: 1
GASTROINTESTINAL NEGATIVE: 1
APPETITE CHANGE: 1
WHEEZING: 1
ACTIVITY CHANGE: 1
HEMATOLOGIC/LYMPHATIC NEGATIVE: 1
NEUROLOGICAL NEGATIVE: 1
SHORTNESS OF BREATH: 1
CARDIOVASCULAR NEGATIVE: 1
COUGH: 1
EYES NEGATIVE: 1
PSYCHIATRIC NEGATIVE: 1
ENDOCRINE NEGATIVE: 1
MUSCULOSKELETAL NEGATIVE: 1

## 2025-07-08 ASSESSMENT — PAIN SCALES - GENERAL: PAINLEVEL_OUTOF10: NO PAIN (0)

## 2025-07-08 NOTE — PROGRESS NOTES
Gala Mendez  1975    ASSESSMENT/PLAN      Presents to rapid clinic with persistent cough with congestion for the last 2-1/2 weeks.  Has had intermittent wheezing, pain, body aches and overall feeling unwell.  Patient has an acute illness with signs of systemic infection.  Patient has no known exposure to specific illness.  Patient is a smoker, active.  Patient has not had fever or chills.  No sore throat or ear pain.  Chest x-ray obtained and shows a masslike opacity on the left upper lobe.  Will treat for pneumonia and CT of chest ordered for future for reevaluation.  Patient's vitals are stable and she appears nontoxic.        1. Acute cough (Primary)    - XR Chest 2 Views    2. Opacity of lung on imaging study    - CT Chest w/o Contrast; Future    3. Pneumonia of left upper lobe due to infectious organism    - cefdinir (OMNICEF) 300 MG capsule; Take 1 capsule (300 mg) by mouth 2 times daily for 10 days.  Dispense: 20 capsule; Refill: 0  - azithromycin (ZITHROMAX) 250 MG tablet; Take 2 tablets (500 mg) by mouth daily for 1 day, THEN 1 tablet (250 mg) daily for 4 days.  Dispense: 6 tablet; Refill: 0    4. Wheezing  5. Hx of smoking     - predniSONE (DELTASONE) 20 MG tablet; Take 2 tablets (40 mg) by mouth daily for 5 days.  Dispense: 10 tablet; Refill: 0  - albuterol (PROAIR HFA/PROVENTIL HFA/VENTOLIN HFA) 108 (90 Base) MCG/ACT inhaler; Inhale 2 puffs into the lungs every 6 hours as needed.  Dispense: 18 g; Refill: 0    - May use over-the-counter Tylenol or ibuprofen PRN  - Follow up as needed for new or worsening symptoms        *A shared decision making model was used.   *Patient and/or associated parties understood and were agreeable to treatment plan.   *Plan and all results were discussed.   *Explanation of diagnosis, treatment options and risk and benefits of medications reviewed with patient.    *Time was taken to answer all questions.   *Red flags symptoms were discussed and patient was advised  "when they should return for reevaluation or for prompt emergency evaluation.   *Patient was given verbal and written instructions on plan of care. Instructions were printed or are available on Mychart on electronic AVS.   *We discussed potential side effects of any prescribed or recommended therapies, as well as expectations for response to treatments.  *Patient discharged in stable condition    Camilla Monterroso CNP  [facility]    SUBJECTIVE  CHIEF COMPLAINT/ REASON FOR VISIT  Patient presents with:  Cough: Persistent productive cough x 2.5 weeks     HISTORY OF PRESENT ILLNESS  Gala Mendez is a pleasant 49 year old female presents to rapid clinic today with persistent cough with congestion for the last 2-1/2 weeks.  Patient has no known exposure to specific illness.  Patient is a smoker, active.  Patient has not had fever or chills.  No sore throat or ear pain.        I have reviewed the nursing notes.  I have reviewed allergies, medication list, problem list, and past medical history.    REVIEW OF SYSTEMS  Review of Systems   Constitutional:  Positive for activity change, appetite change and fatigue.   Eyes: Negative.    Respiratory:  Positive for cough, shortness of breath and wheezing.    Cardiovascular: Negative.    Gastrointestinal: Negative.    Endocrine: Negative.    Genitourinary: Negative.    Musculoskeletal: Negative.    Skin: Negative.    Neurological: Negative.    Hematological: Negative.    Psychiatric/Behavioral: Negative.     All other systems reviewed and are negative.       VITAL SIGNS  Vitals:    07/08/25 1342   BP: 120/84   Pulse: 92   Resp: 16   Temp: 98.2  F (36.8  C)   TempSrc: Temporal   SpO2: 93%   Weight: 111.6 kg (246 lb)   Height: 1.727 m (5' 8\")      Body mass index is 37.4 kg/m .      OBJECTIVE  PHYSICAL EXAM  Physical Exam  Vitals and nursing note reviewed.   Constitutional:       Appearance: Normal appearance.   HENT:      Head: Normocephalic.      Nose: Nose " normal.      Mouth/Throat:      Mouth: Mucous membranes are moist.   Cardiovascular:      Rate and Rhythm: Normal rate and regular rhythm.      Pulses: Normal pulses.      Heart sounds: Normal heart sounds.   Pulmonary:      Effort: Pulmonary effort is normal.      Breath sounds: Wheezing and rhonchi present.   Abdominal:      Palpations: Abdomen is soft.   Musculoskeletal:         General: Normal range of motion.      Cervical back: Normal range of motion.   Skin:     General: Skin is warm and dry.      Capillary Refill: Capillary refill takes less than 2 seconds.   Neurological:      General: No focal deficit present.      Mental Status: She is alert.            DIAGNOSTICS    Results for orders placed or performed in visit on 07/08/25   XR Chest 2 Views     Status: None    Narrative    PROCEDURE:  XR CHEST 2 VIEWS    HISTORY:  Acute cough.     COMPARISON:  None.    FINDINGS:   The cardiac silhouette is normal in size. The pulmonary vasculature is  normal.  There is a nodular masslike opacity in the left hilar region  measuring 3.5 cm in diameter. There are patchy opacities in the left  upper lung, although some of which this could be overlying/outside of  the patient. The right lung is clear. No pleural effusion or  pneumothorax.      Impression    IMPRESSION:  Masslike left hilar opacity. Potential additional upper  lung infiltrates. Consider follow-up CT chest.      AYO GARCIAS MD         SYSTEM ID:  [id number]

## 2025-07-08 NOTE — NURSING NOTE
"Chief Complaint   Patient presents with    Cough     Persistent productive cough x 2.5 weeks       Initial /84   Pulse 92   Temp 98.2  F (36.8  C) (Temporal)   Resp 16   Ht 1.727 m (5' 8\")   Wt 111.6 kg (246 lb)   SpO2 93%   Breastfeeding No   BMI 37.40 kg/m   Estimated body mass index is 37.4 kg/m  as calculated from the following:    Height as of this encounter: 1.727 m (5' 8\").    Weight as of this encounter: 111.6 kg (246 lb).  Medication Review: complete    The next two questions are to help us understand your food security.  If you are feeling you need any assistance in this area, we have resources available to support you today.          7/24/2024   SDOH- Food Insecurity   Within the past 12 months, did you worry that your food would run out before you got money to buy more? N   Within the past 12 months, did the food you bought just not last and you didn t have money to get more? N        Data saved with a previous flowsheet row definition         Health Care Directive:  Patient does not have a Health Care Directive: Discussed advance care planning with patient; however, patient declined at this time.    Norma J. Gosselin, LPN      "

## 2025-07-09 ENCOUNTER — PATIENT OUTREACH (OUTPATIENT)
Dept: CARE COORDINATION | Facility: CLINIC | Age: 50
End: 2025-07-09
Payer: COMMERCIAL

## 2025-07-23 ENCOUNTER — PATIENT OUTREACH (OUTPATIENT)
Dept: CARE COORDINATION | Facility: CLINIC | Age: 50
End: 2025-07-23
Payer: COMMERCIAL

## 2025-07-25 ENCOUNTER — HOSPITAL ENCOUNTER (OUTPATIENT)
Dept: CT IMAGING | Facility: OTHER | Age: 50
Discharge: HOME | End: 2025-07-25
Attending: NURSE PRACTITIONER | Admitting: RADIOLOGY
Payer: COMMERCIAL

## 2025-07-25 DIAGNOSIS — J18.9 PNEUMONIA OF LEFT UPPER LOBE DUE TO INFECTIOUS ORGANISM: ICD-10-CM

## 2025-07-25 DIAGNOSIS — R91.8 OPACITY OF LUNG ON IMAGING STUDY: ICD-10-CM

## 2025-07-25 PROCEDURE — 71260 CT THORAX DX C+: CPT

## 2025-07-25 PROCEDURE — 250N000011 HC RX IP 250 OP 636: Performed by: NURSE PRACTITIONER

## 2025-07-25 PROCEDURE — 71260 CT THORAX DX C+: CPT | Mod: 26 | Performed by: RADIOLOGY

## 2025-07-25 PROCEDURE — 250N000009 HC RX 250: Performed by: NURSE PRACTITIONER

## 2025-07-25 RX ORDER — IOPAMIDOL 755 MG/ML
90 INJECTION, SOLUTION INTRAVASCULAR ONCE
Status: COMPLETED | OUTPATIENT
Start: 2025-07-25 | End: 2025-07-25

## 2025-07-25 RX ADMIN — IOPAMIDOL 90 ML: 755 INJECTION, SOLUTION INTRAVENOUS at 10:34

## 2025-07-25 RX ADMIN — SODIUM CHLORIDE 60 ML: 9 INJECTION, SOLUTION INTRAVENOUS at 10:34

## 2025-07-30 ENCOUNTER — OFFICE VISIT (OUTPATIENT)
Dept: FAMILY MEDICINE | Facility: OTHER | Age: 50
End: 2025-07-30
Attending: FAMILY MEDICINE
Payer: COMMERCIAL

## 2025-07-30 VITALS
HEART RATE: 80 BPM | RESPIRATION RATE: 20 BRPM | BODY MASS INDEX: 37.86 KG/M2 | DIASTOLIC BLOOD PRESSURE: 80 MMHG | OXYGEN SATURATION: 97 % | SYSTOLIC BLOOD PRESSURE: 120 MMHG | WEIGHT: 249 LBS | TEMPERATURE: 97.9 F

## 2025-07-30 DIAGNOSIS — R91.8 PULMONARY MASS: Primary | ICD-10-CM

## 2025-07-30 RX ORDER — ALPRAZOLAM 0.5 MG
TABLET ORAL
Qty: 1 TABLET | Refills: 0 | Status: SHIPPED | OUTPATIENT
Start: 2025-07-30

## 2025-07-30 ASSESSMENT — PAIN SCALES - GENERAL: PAINLEVEL_OUTOF10: NO PAIN (0)

## 2025-07-30 NOTE — NURSING NOTE
Patient here for follow up cough which is better except for the smoky days. She would like to go over the chest CT scan results. Medication Reconciliation: complete.    Nayeli Shah LPN  7/30/2025 8:22 AM

## 2025-07-31 NOTE — PROGRESS NOTES
SUBJECTIVE:   Gala Mendez is a 49 year old female who presents to clinic today for the following health issues:    History of Present Illness       Reason for visit:  Follow up on testing and planning  Symptom onset:  3-4 weeks ago  Symptoms include:  Had cough  Symptom intensity:  Severe  Symptom progression:  Improving  Had these symptoms before:  Yes  Has tried/received treatment for these symptoms:  Yes  Previous treatment was successful:  Yes  Prior treatment description:  Antibotics   She is taking medications regularly.    Patient arrives here for follow-up of lung mass.  Patient was seen 7 8 for chronic cough.  Chest x-ray at that time showed possible mass.  CT scan did come back showing a 3.6 cm diameter mass in the left upper lobe.  Radiology reports very suspicious for malignancy.  She was also found to have 2 masses on her adrenal glands clear.  With recommendations for a MR or CT scan.    Patient Active Problem List    Diagnosis Date Noted    Pulmonary mass 07/30/2025     Priority: Medium    Family history of malignant neoplasm of ovary 02/20/2025     Priority: Medium    Family history of diabetes mellitus 01/18/2018     Priority: Medium    Family history of pancreatic cancer 01/18/2018     Priority: Medium    FHx: breast cancer 10/19/2016     Priority: Medium    Cervical dysplasia, mild 01/31/2012     Priority: Medium    Tobacco abuse 01/31/2012     Priority: Medium    Backache 12/12/2011     Priority: Medium    Cervical high risk human papillomavirus (HPV) DNA test positive 08/30/2011     Priority: Medium    Papanicolaou smear of cervix with atypical squamous cells of undetermined significance (ASC-US) 08/30/2011     Priority: Medium     Overview:   History of LSIL      Allergic rhinitis 08/16/2011     Priority: Medium    Encounter for surveillance of injectable contraceptive 06/29/2010     Priority: Medium     Past Medical History:   Diagnosis Date    Benign essential hypertension      Personal history of other medical treatment (CODE)     G2, P1-0-1-1,  x1, EAB x1    Personal history of other medical treatment (CODE)     2008,with negative colposcopy        Review of Systems     OBJECTIVE:     /80   Pulse 80   Temp 97.9  F (36.6  C)   Resp 20   Wt 112.9 kg (249 lb)   SpO2 97%   BMI 37.86 kg/m    Body mass index is 37.86 kg/m .  Physical Exam  Constitutional:       Appearance: Normal appearance.   Cardiovascular:      Rate and Rhythm: Normal rate and regular rhythm.   Neurological:      Mental Status: She is alert.   Psychiatric:         Mood and Affect: Mood normal.         Diagnostic Test Results:  none     ASSESSMENT/PLAN:         (R91.8) Pulmonary mass  (primary encounter diagnosis)  Comment:   Plan: Adult Pulmonary Medicine  Referral, MR        Adrenal with Contrast, ALPRAZolam (XANAX) 0.5         MG tablet      I reviewed CT scan with the patient.  Will proceed with pulmonary consult and MR of the adrenal glands.  Patient would prefer to go to [place] for her pulmonary consult.    The longitudinal plan of care for the diagnosis(es)/condition(s) as documented were addressed during this visit. Due to the added complexity in care, I will continue to support Gala in the subsequent management and with ongoing continuity of care.  Will get back to patient with her MR results when they to return.    Gaurav Dyer MD  [facility]

## 2025-08-05 ENCOUNTER — MYC MEDICAL ADVICE (OUTPATIENT)
Dept: FAMILY MEDICINE | Facility: OTHER | Age: 50
End: 2025-08-05
Payer: COMMERCIAL

## 2025-08-14 ENCOUNTER — HOSPITAL ENCOUNTER (OUTPATIENT)
Dept: MRI IMAGING | Facility: OTHER | Age: 50
End: 2025-08-14
Attending: FAMILY MEDICINE
Payer: COMMERCIAL

## 2025-08-14 DIAGNOSIS — R91.8 PULMONARY MASS: ICD-10-CM

## 2025-08-14 PROCEDURE — A9575 INJ GADOTERATE MEGLUMI 0.1ML: HCPCS | Performed by: FAMILY MEDICINE

## 2025-08-14 PROCEDURE — 255N000002 HC RX 255 OP 636: Performed by: FAMILY MEDICINE

## 2025-08-14 PROCEDURE — 74183 MRI ABD W/O CNTR FLWD CNTR: CPT

## 2025-08-14 RX ADMIN — GADOTERATE MEGLUMINE 20 ML: 376.9 INJECTION INTRAVENOUS at 12:45

## (undated) RX ORDER — MEDROXYPROGESTERONE ACETATE 150 MG/ML
INJECTION, SUSPENSION INTRAMUSCULAR
Status: DISPENSED
Start: 2021-06-29

## (undated) RX ORDER — MEDROXYPROGESTERONE ACETATE 150 MG/ML
INJECTION, SUSPENSION INTRAMUSCULAR
Status: DISPENSED
Start: 2019-03-18

## (undated) RX ORDER — MEDROXYPROGESTERONE ACETATE 150 MG/ML
INJECTION, SUSPENSION INTRAMUSCULAR
Status: DISPENSED
Start: 2021-01-18

## (undated) RX ORDER — MEDROXYPROGESTERONE ACETATE 150 MG/ML
INJECTION, SUSPENSION INTRAMUSCULAR
Status: DISPENSED
Start: 2023-09-22

## (undated) RX ORDER — MEDROXYPROGESTERONE ACETATE 150 MG/ML
INJECTION, SUSPENSION INTRAMUSCULAR
Status: DISPENSED
Start: 2018-02-12

## (undated) RX ORDER — MEDROXYPROGESTERONE ACETATE 150 MG/ML
INJECTION, SUSPENSION INTRAMUSCULAR
Status: DISPENSED
Start: 2020-08-04

## (undated) RX ORDER — MEDROXYPROGESTERONE ACETATE 150 MG/ML
INJECTION, SUSPENSION INTRAMUSCULAR
Status: DISPENSED
Start: 2018-12-28

## (undated) RX ORDER — MEDROXYPROGESTERONE ACETATE 150 MG/ML
INJECTION, SUSPENSION INTRAMUSCULAR
Status: DISPENSED
Start: 2023-06-26

## (undated) RX ORDER — MEDROXYPROGESTERONE ACETATE 150 MG/ML
INJECTION, SUSPENSION INTRAMUSCULAR
Status: DISPENSED
Start: 2021-04-08

## (undated) RX ORDER — MEDROXYPROGESTERONE ACETATE 150 MG/ML
INJECTION, SUSPENSION INTRAMUSCULAR
Status: DISPENSED
Start: 2021-09-14

## (undated) RX ORDER — MEDROXYPROGESTERONE ACETATE 150 MG/ML
INJECTION, SUSPENSION INTRAMUSCULAR
Status: DISPENSED
Start: 2023-12-20

## (undated) RX ORDER — MEDROXYPROGESTERONE ACETATE 150 MG/ML
INJECTION, SUSPENSION INTRAMUSCULAR
Status: DISPENSED
Start: 2022-02-18

## (undated) RX ORDER — MEDROXYPROGESTERONE ACETATE 150 MG/ML
INJECTION, SUSPENSION INTRAMUSCULAR
Status: DISPENSED
Start: 2023-04-04

## (undated) RX ORDER — MEDROXYPROGESTERONE ACETATE 150 MG/ML
INJECTION, SUSPENSION INTRAMUSCULAR
Status: DISPENSED
Start: 2018-10-08

## (undated) RX ORDER — MEDROXYPROGESTERONE ACETATE 150 MG/ML
INJECTION, SUSPENSION INTRAMUSCULAR
Status: DISPENSED
Start: 2018-07-23

## (undated) RX ORDER — MEDROXYPROGESTERONE ACETATE 150 MG/ML
INJECTION, SUSPENSION INTRAMUSCULAR
Status: DISPENSED
Start: 2023-01-16

## (undated) RX ORDER — MEDROXYPROGESTERONE ACETATE 150 MG/ML
INJECTION, SUSPENSION INTRAMUSCULAR
Status: DISPENSED
Start: 2022-08-10

## (undated) RX ORDER — MEDROXYPROGESTERONE ACETATE 150 MG/ML
INJECTION, SUSPENSION INTRAMUSCULAR
Status: DISPENSED
Start: 2019-06-03

## (undated) RX ORDER — MEDROXYPROGESTERONE ACETATE 150 MG/ML
INJECTION, SUSPENSION INTRAMUSCULAR
Status: DISPENSED
Start: 2024-03-18

## (undated) RX ORDER — MEDROXYPROGESTERONE ACETATE 150 MG/ML
INJECTION, SUSPENSION INTRAMUSCULAR
Status: DISPENSED
Start: 2019-09-23

## (undated) RX ORDER — MEDROXYPROGESTERONE ACETATE 150 MG/ML
INJECTION, SUSPENSION INTRAMUSCULAR
Status: DISPENSED
Start: 2020-03-02

## (undated) RX ORDER — MEDROXYPROGESTERONE ACETATE 150 MG/ML
INJECTION, SUSPENSION INTRAMUSCULAR
Status: DISPENSED
Start: 2020-05-18

## (undated) RX ORDER — MEDROXYPROGESTERONE ACETATE 150 MG/ML
INJECTION, SUSPENSION INTRAMUSCULAR
Status: DISPENSED
Start: 2018-05-04

## (undated) RX ORDER — MEDROXYPROGESTERONE ACETATE 150 MG/ML
INJECTION, SUSPENSION INTRAMUSCULAR
Status: DISPENSED
Start: 2022-10-28

## (undated) RX ORDER — MEDROXYPROGESTERONE ACETATE 150 MG/ML
INJECTION, SUSPENSION INTRAMUSCULAR
Status: DISPENSED
Start: 2021-12-03

## (undated) RX ORDER — MEDROXYPROGESTERONE ACETATE 150 MG/ML
INJECTION, SUSPENSION INTRAMUSCULAR
Status: DISPENSED
Start: 2019-12-13

## (undated) RX ORDER — MEDROXYPROGESTERONE ACETATE 150 MG/ML
INJECTION, SUSPENSION INTRAMUSCULAR
Status: DISPENSED
Start: 2020-10-23

## (undated) RX ORDER — CLONIDINE HYDROCHLORIDE 0.1 MG/1
TABLET ORAL
Status: DISPENSED
Start: 2022-04-19

## (undated) RX ORDER — MEDROXYPROGESTERONE ACETATE 150 MG/ML
INJECTION, SUSPENSION INTRAMUSCULAR
Status: DISPENSED
Start: 2022-05-18